# Patient Record
Sex: FEMALE | Race: WHITE | Employment: OTHER | URBAN - METROPOLITAN AREA
[De-identification: names, ages, dates, MRNs, and addresses within clinical notes are randomized per-mention and may not be internally consistent; named-entity substitution may affect disease eponyms.]

---

## 2022-09-17 ENCOUNTER — APPOINTMENT (OUTPATIENT)
Dept: GENERAL RADIOLOGY | Age: 67
End: 2022-09-17
Payer: MEDICARE

## 2022-09-17 ENCOUNTER — HOSPITAL ENCOUNTER (EMERGENCY)
Age: 67
Discharge: HOME | End: 2022-09-17
Attending: EMERGENCY MEDICINE
Payer: MEDICARE

## 2022-09-17 VITALS
SYSTOLIC BLOOD PRESSURE: 157 MMHG | DIASTOLIC BLOOD PRESSURE: 79 MMHG | HEART RATE: 78 BPM | TEMPERATURE: 99 F | HEIGHT: 68 IN | WEIGHT: 151 LBS | BODY MASS INDEX: 22.88 KG/M2 | OXYGEN SATURATION: 100 % | RESPIRATION RATE: 21 BRPM

## 2022-09-17 DIAGNOSIS — S73.005A DISLOCATION OF LEFT HIP, INITIAL ENCOUNTER (HCC): Primary | ICD-10-CM

## 2022-09-17 PROCEDURE — 99285 EMERGENCY DEPT VISIT HI MDM: CPT

## 2022-09-17 PROCEDURE — 73502 X-RAY EXAM HIP UNI 2-3 VIEWS: CPT

## 2022-09-17 PROCEDURE — 27250 TREAT HIP DISLOCATION: CPT

## 2022-09-17 PROCEDURE — 96374 THER/PROPH/DIAG INJ IV PUSH: CPT

## 2022-09-17 PROCEDURE — 6360000002 HC RX W HCPCS: Performed by: EMERGENCY MEDICINE

## 2022-09-17 RX ORDER — MORPHINE SULFATE 4 MG/ML
4 INJECTION, SOLUTION INTRAMUSCULAR; INTRAVENOUS ONCE
Status: COMPLETED | OUTPATIENT
Start: 2022-09-17 | End: 2022-09-17

## 2022-09-17 RX ORDER — PROPOFOL 10 MG/ML
60 INJECTION, EMULSION INTRAVENOUS ONCE
Status: COMPLETED | OUTPATIENT
Start: 2022-09-17 | End: 2022-09-17

## 2022-09-17 RX ORDER — HYDROCODONE BITARTRATE AND ACETAMINOPHEN 5; 325 MG/1; MG/1
1 TABLET ORAL EVERY 4 HOURS PRN
Qty: 12 TABLET | Refills: 0 | Status: SHIPPED | OUTPATIENT
Start: 2022-09-17 | End: 2022-09-20

## 2022-09-17 RX ORDER — ACETAMINOPHEN 325 MG/1
650 TABLET ORAL ONCE
Status: DISCONTINUED | OUTPATIENT
Start: 2022-09-17 | End: 2022-09-17

## 2022-09-17 RX ADMIN — PROPOFOL 60 MG: 10 INJECTION, EMULSION INTRAVENOUS at 19:55

## 2022-09-17 RX ADMIN — MORPHINE SULFATE 4 MG: 4 INJECTION, SOLUTION INTRAMUSCULAR; INTRAVENOUS at 17:29

## 2022-09-17 ASSESSMENT — PAIN DESCRIPTION - LOCATION: LOCATION: HIP

## 2022-09-17 ASSESSMENT — PAIN - FUNCTIONAL ASSESSMENT: PAIN_FUNCTIONAL_ASSESSMENT: 0-10

## 2022-09-17 ASSESSMENT — PAIN DESCRIPTION - ORIENTATION: ORIENTATION: LEFT

## 2022-09-17 ASSESSMENT — PAIN SCALES - GENERAL: PAINLEVEL_OUTOF10: 9

## 2022-09-17 ASSESSMENT — PAIN DESCRIPTION - PAIN TYPE: TYPE: ACUTE PAIN

## 2022-09-17 ASSESSMENT — ENCOUNTER SYMPTOMS
ABDOMINAL PAIN: 0
BACK PAIN: 0
SHORTNESS OF BREATH: 0

## 2022-09-17 ASSESSMENT — PAIN DESCRIPTION - DESCRIPTORS: DESCRIPTORS: SHOOTING

## 2022-09-17 NOTE — ED PROVIDER NOTES
Patient is a 73 yo female who has a PMHx of GERD, bilateral hip replacement, is here for hip pain. [place] [facility] [place], here this weekend for a rally and felt her left hip pop out with sudden, severe pain when she got out of the car. She states that she had the same feeling 4 days ago when she went to the [facility]. [place] [facility], they popped her hip back with fentanyl. Today, she haven't taken anything for pain. She has her leg internally rotated and unable to move her left extremity but she was able to wiggle her toes and sensation is intact. Distal pulses felt. She had a facelift a week ago so visible bruises around her jaws bilaterally. The history is provided by the patient. Patient is a 74 y.o. female presents with a chief complaint of left hip pain. This has been occurring for a few hours. Patient states that it gets better with rest.  Patient states that it gets worse with movement. Patient states that it is severe in severity. Patient states it was acute in onset. Review of Systems   Eyes:  Negative for visual disturbance. Respiratory:  Negative for shortness of breath. Cardiovascular:  Negative for chest pain and leg swelling. Gastrointestinal:  Negative for abdominal pain. Genitourinary:  Negative for flank pain. Musculoskeletal:  Negative for back pain. Pain in the left hip, doesn't radiate anywhere, unable to move it   Neurological:  Negative for dizziness and headaches. Physical Exam  Constitutional:       General: She is in acute distress. Appearance: Normal appearance. She is normal weight. HENT:      Head: Normocephalic and atraumatic. Comments: Bruises present on her cheeks bilaterally, post face lift a week ago  Eyes:      Extraocular Movements: Extraocular movements intact. Pupils: Pupils are equal, round, and reactive to light. Cardiovascular:      Rate and Rhythm: Normal rate and regular rhythm.       Pulses: Normal pulses. Heart sounds: Normal heart sounds. Pulmonary:      Effort: Pulmonary effort is normal.      Breath sounds: Normal breath sounds. Musculoskeletal:         General: Tenderness present. Cervical back: Normal range of motion and neck supple. Comments: Left leg is internally rotated at the hip, able to move her toes voluntarily with no pain. Sensation intact, distal pulses felt. Skin:     General: Skin is warm and dry. Neurological:      General: No focal deficit present. Mental Status: She is alert and oriented to person, place, and time. Sensory: No sensory deficit. Motor: No weakness. Psychiatric:         Mood and Affect: Mood normal.         Behavior: Behavior normal.          MDM  Number of Diagnoses or Management Options  Dislocation of left hip, initial encounter [facility]): established and improving  Diagnosis management comments: Patient is a 75 yo female who came in with pain in the left hip. She is unable to mobilize the hip. She is from [place] and she was here for a rally. Patient was getting out of the car when she felt a pop in her hip. She had a dislocation 4 days ago and had her hip reduced in [place]. Patient states that this is the same as that time. XR confirms. Patient got her hip reduced in the ED under conscious sedation. She was observed after the procedure. She was discharged with Norco for analgesia and asked to follow up with her orthopedic surgeon.        Amount and/or Complexity of Data Reviewed  Clinical lab tests: reviewed and ordered  Tests in the radiology section of CPT®: reviewed and ordered  Tests in the medicine section of CPT®: reviewed    Risk of Complications, Morbidity, and/or Mortality  Presenting problems: low  Diagnostic procedures: low  Management options: low    Patient Progress  Patient progress: improved       ED Course as of 09/17/22 2126   Sat Sep 17, 2022   1720 Analgesia given, XR ordered [PK]   1954 XR shows posterior todays results, in addition to providing specific details for the plan of care and counseling regarding the diagnosis and prognosis. Their questions are answered at this time and they are agreeable with the plan. I discussed at length with them reasons for immediate return here for re evaluation. They will followup with their orthopedic surgeon by calling their office as soon as possible.      --------------------------------- ADDITIONAL PROVIDER NOTES ---------------------------------  At this time the patient is without objective evidence of an acute process requiring hospitalization or inpatient management. They have remained hemodynamically stable throughout their entire ED visit and are stable for discharge with outpatient follow-up. The plan has been discussed in detail and they are aware of the specific conditions for emergent return, as well as the importance of follow-up. Discharge Medication List as of 9/17/2022  8:06 PM        START taking these medications    Details   HYDROcodone-acetaminophen (NORCO) 5-325 MG per tablet Take 1 tablet by mouth every 4 hours as needed for Pain for up to 3 days. Intended supply: 3 days. Take lowest dose possible to manage pain, Disp-12 tablet, R-0Print             Diagnosis:  1. Dislocation of left hip, initial encounter [facility])        Disposition:  Patient's disposition: Discharge to home  Patient's condition is stable. Patient was given return precautions. Labs were interpreted by me. Patient will follow up with their primary care provider. Patient is agreeable to this plan. Patient has remained stable throughout their stay in the ED. Patient was seen and evaluated by myself and my attending Maged Driver DO. Assessment and Plan discussed with attending provider, please see attestation for final plan of care. This note was done using dictation software and there may be some grammatical errors associated with this.     Padma Ann MD Shirley Loja MD  Resident  09/17/22 4527

## 2022-09-18 NOTE — ED PROVIDER NOTES
-------------------------------- Conscious Sedation Procedure Note -----------------------------  Patient Name: Brendon Candelaria   Medical Record Number: [id number]  Date: 9/17/22   Time: 8:07 PM EDT   Room/Bed: 17A/17A-17    Indication: hip dislocation  Consent: I have discussed with the patient and/or the patient representative the indication, alternatives, and the possible risks and/or complications of the planned procedure and the anesthesia methods. The patient and/or patient representative appear to understand and agree to proceed. Physician Involvement: The attending physician was present and supervising this procedure. 9/17/22     Time: 1945 (pre-procedure start time)  Pre-Sedation Documentation and Exam: I have personally completed a history, physical exam & review of systems for this patient (see notes). Vital signs have been reviewed (see flow sheet for vitals). I have reviewed the patient's history and review of systems. Airway Assessment: normal  Prior History of Anesthesia Complications: none  ASA Classification: Class 2 - A normal healthy patient with mild systemic disease  Sedation/ Anesthesia Plan: intravenous sedation  Medications Used: propofol intravenously  Monitoring and Safety: The patient was placed on a cardiac monitor and vital signs, pulse oximetry and level of consciousness were continuously evaluated throughout the procedure. The patient was closely monitored until recovery from the medications was complete and the patient had returned to baseline status.  Respiratory therapy was on standby at all times during the procedure.    ----------------------------------- Post Conscious Sedation Note -----------------------------------    9/17/22     Time: 2010 (post-procedure stop time)  Post-Sedation Vital Signs: Vital signs were reviewed and were stable after the procedure (see flow sheet for vitals)       Post-Sedation Exam: Lungs: clear to auscultation bilaterally       Complications: none    Electronically Signed by: Olivia Hong DO        PROCEDURE NOTE    9/17/22       Time: 2000    JOINT  REDUCTION  PROCEDURE  Risks, benefits and alternatives (for applicable procedures below) described. Performed By: Olivia Hong DO. Indication: Joint dislocation  Informed consent: Written consent obtained. The patient was counseled regarding the procedure in person, it's indications, risks, potential complications and alternatives and any questions were answered. Consent was obtained. .  Location:   left  hip  Procedure:  Anesthsetic/anesthsia was obtained using conscious sedation with propofol intravenously. Attempted reduction was performed by BOWEN HIP METHOD: Traction, internal rotation, and then external rotation once the femoral hip clears the acetabular rim. Patient supine with uninjured hip and knee flexed. one of the surgeon's hands is place underneath the injured knee and over top of the uninjured knee; the other hand grab's the ankle (injured side) to generate traction (by pressing down on ankle). r and was successful. Patient tolerated the procedure well. Post-reduction XR's:  were obtained and revealed satisfactory reduction.   Orthopaedic Consultation:  No.            Olivia Hong DO  09/17/22 2009

## 2025-06-24 ENCOUNTER — APPOINTMENT (OUTPATIENT)
Dept: RADIOLOGY | Facility: HOSPITAL | Age: 70
DRG: 660 | End: 2025-06-24
Payer: MEDICARE

## 2025-06-24 ENCOUNTER — HOSPITAL ENCOUNTER (EMERGENCY)
Dept: CARDIOLOGY | Facility: HOSPITAL | Age: 70
Discharge: HOME | DRG: 660 | End: 2025-06-24
Payer: MEDICARE

## 2025-06-24 ENCOUNTER — HOSPITAL ENCOUNTER (INPATIENT)
Facility: HOSPITAL | Age: 70
LOS: 2 days | Discharge: HOME | DRG: 660 | End: 2025-06-26
Attending: EMERGENCY MEDICINE | Admitting: INTERNAL MEDICINE
Payer: MEDICARE

## 2025-06-24 DIAGNOSIS — N13.9 OBSTRUCTIVE UROPATHY: Primary | ICD-10-CM

## 2025-06-24 DIAGNOSIS — G89.18 POST-OP PAIN: ICD-10-CM

## 2025-06-24 DIAGNOSIS — N18.9 ACUTE KIDNEY INJURY SUPERIMPOSED ON CHRONIC KIDNEY DISEASE: ICD-10-CM

## 2025-06-24 DIAGNOSIS — N17.9 ACUTE KIDNEY INJURY SUPERIMPOSED ON CHRONIC KIDNEY DISEASE: ICD-10-CM

## 2025-06-24 DIAGNOSIS — N20.1 CALCULUS OF URETER: ICD-10-CM

## 2025-06-24 DIAGNOSIS — N20.0 NEPHROLITHIASIS: ICD-10-CM

## 2025-06-24 DIAGNOSIS — N13.2 HYDRONEPHROSIS WITH URINARY OBSTRUCTION DUE TO RENAL CALCULUS: ICD-10-CM

## 2025-06-24 PROBLEM — N13.30 HYDRONEPHROSIS OF LEFT KIDNEY: Status: ACTIVE | Noted: 2025-06-24

## 2025-06-24 PROBLEM — E03.9 HYPOTHYROIDISM: Status: ACTIVE | Noted: 2025-06-24

## 2025-06-24 PROBLEM — E78.5 HYPERLIPIDEMIA: Status: ACTIVE | Noted: 2025-06-24

## 2025-06-24 PROBLEM — I10 PRIMARY HYPERTENSION: Status: ACTIVE | Noted: 2025-06-24

## 2025-06-24 PROBLEM — R73.03 PREDIABETES: Status: ACTIVE | Noted: 2025-06-24

## 2025-06-24 LAB
ALBUMIN SERPL BCP-MCNC: 4.7 G/DL (ref 3.4–5)
ALP SERPL-CCNC: 64 U/L (ref 33–136)
ALT SERPL W P-5'-P-CCNC: 17 U/L (ref 7–45)
ANION GAP SERPL CALC-SCNC: 15 MMOL/L (ref 10–20)
APPEARANCE UR: CLEAR
AST SERPL W P-5'-P-CCNC: 30 U/L (ref 9–39)
ATRIAL RATE: 76 BPM
BACTERIA #/AREA URNS AUTO: ABNORMAL /HPF
BASOPHILS # BLD AUTO: 0.02 X10*3/UL (ref 0–0.1)
BASOPHILS NFR BLD AUTO: 0.4 %
BILIRUB SERPL-MCNC: 0.8 MG/DL (ref 0–1.2)
BILIRUB UR STRIP.AUTO-MCNC: NEGATIVE MG/DL
BUN SERPL-MCNC: 21 MG/DL (ref 6–23)
CALCIUM SERPL-MCNC: 9.4 MG/DL (ref 8.6–10.3)
CARDIAC TROPONIN I PNL SERPL HS: 3 NG/L (ref 0–13)
CHLORIDE SERPL-SCNC: 105 MMOL/L (ref 98–107)
CHLORIDE UR-SCNC: 127 MMOL/L
CHLORIDE/CREATININE (MMOL/G) IN URINE: 182 MMOL/G CREAT (ref 38–318)
CO2 SERPL-SCNC: 22 MMOL/L (ref 21–32)
COLOR UR: ABNORMAL
CREAT SERPL-MCNC: 1.33 MG/DL (ref 0.5–1.05)
CREAT UR-MCNC: 69.8 MG/DL (ref 20–320)
CREAT UR-MCNC: 69.8 MG/DL (ref 20–320)
EGFRCR SERPLBLD CKD-EPI 2021: 43 ML/MIN/1.73M*2
EOSINOPHIL # BLD AUTO: 0.08 X10*3/UL (ref 0–0.4)
EOSINOPHIL NFR BLD AUTO: 1.5 %
ERYTHROCYTE [DISTWIDTH] IN BLOOD BY AUTOMATED COUNT: 13.3 % (ref 11.5–14.5)
GLUCOSE SERPL-MCNC: 110 MG/DL (ref 74–99)
GLUCOSE UR STRIP.AUTO-MCNC: NORMAL MG/DL
HCT VFR BLD AUTO: 38.6 % (ref 36–46)
HGB BLD-MCNC: 12.9 G/DL (ref 12–16)
IMM GRANULOCYTES # BLD AUTO: 0.02 X10*3/UL (ref 0–0.5)
IMM GRANULOCYTES NFR BLD AUTO: 0.4 % (ref 0–0.9)
KETONES UR STRIP.AUTO-MCNC: NEGATIVE MG/DL
LACTATE SERPL-SCNC: 1.2 MMOL/L (ref 0.4–2)
LEUKOCYTE ESTERASE UR QL STRIP.AUTO: NEGATIVE
LIPASE SERPL-CCNC: 22 U/L (ref 9–82)
LYMPHOCYTES # BLD AUTO: 1.57 X10*3/UL (ref 0.8–3)
LYMPHOCYTES NFR BLD AUTO: 29.4 %
MCH RBC QN AUTO: 30.6 PG (ref 26–34)
MCHC RBC AUTO-ENTMCNC: 33.4 G/DL (ref 32–36)
MCV RBC AUTO: 92 FL (ref 80–100)
MONOCYTES # BLD AUTO: 0.42 X10*3/UL (ref 0.05–0.8)
MONOCYTES NFR BLD AUTO: 7.9 %
MUCOUS THREADS #/AREA URNS AUTO: ABNORMAL /LPF
NEUTROPHILS # BLD AUTO: 3.23 X10*3/UL (ref 1.6–5.5)
NEUTROPHILS NFR BLD AUTO: 60.4 %
NITRITE UR QL STRIP.AUTO: NEGATIVE
NRBC BLD-RTO: 0 /100 WBCS (ref 0–0)
P AXIS: -21 DEGREES
P OFFSET: 177 MS
P ONSET: 149 MS
PH UR STRIP.AUTO: 5.5 [PH]
PLATELET # BLD AUTO: 206 X10*3/UL (ref 150–450)
POTASSIUM SERPL-SCNC: 4 MMOL/L (ref 3.5–5.3)
POTASSIUM UR-SCNC: 30 MMOL/L
POTASSIUM/CREAT UR-RTO: 43 MMOL/G CREAT
PR INTERVAL: 144 MS
PROT SERPL-MCNC: 7 G/DL (ref 6.4–8.2)
PROT UR STRIP.AUTO-MCNC: NEGATIVE MG/DL
PROT UR-ACNC: 15 MG/DL (ref 5–24)
PROT/CREAT UR: 0.21 MG/MG CREAT (ref 0–0.17)
Q ONSET: 221 MS
QRS COUNT: 12 BEATS
QRS DURATION: 78 MS
QT INTERVAL: 412 MS
QTC CALCULATION(BAZETT): 463 MS
QTC FREDERICIA: 445 MS
R AXIS: 74 DEGREES
RBC # BLD AUTO: 4.22 X10*6/UL (ref 4–5.2)
RBC # UR STRIP.AUTO: ABNORMAL MG/DL
RBC #/AREA URNS AUTO: >20 /HPF
SODIUM SERPL-SCNC: 138 MMOL/L (ref 136–145)
SODIUM UR-SCNC: 116 MMOL/L
SODIUM/CREAT UR-RTO: 166 MMOL/G CREAT
SP GR UR STRIP.AUTO: 1.03
T AXIS: 70 DEGREES
T OFFSET: 427 MS
UROBILINOGEN UR STRIP.AUTO-MCNC: NORMAL MG/DL
VENTRICULAR RATE: 76 BPM
WBC # BLD AUTO: 5.3 X10*3/UL (ref 4.4–11.3)
WBC #/AREA URNS AUTO: ABNORMAL /HPF

## 2025-06-24 PROCEDURE — 96374 THER/PROPH/DIAG INJ IV PUSH: CPT

## 2025-06-24 PROCEDURE — 82570 ASSAY OF URINE CREATININE: CPT | Performed by: INTERNAL MEDICINE

## 2025-06-24 PROCEDURE — 87086 URINE CULTURE/COLONY COUNT: CPT | Mod: ELYLAB | Performed by: PHYSICIAN ASSISTANT

## 2025-06-24 PROCEDURE — G0378 HOSPITAL OBSERVATION PER HR: HCPCS

## 2025-06-24 PROCEDURE — 74177 CT ABD & PELVIS W/CONTRAST: CPT | Performed by: RADIOLOGY

## 2025-06-24 PROCEDURE — 84484 ASSAY OF TROPONIN QUANT: CPT | Performed by: PHYSICIAN ASSISTANT

## 2025-06-24 PROCEDURE — 36415 COLL VENOUS BLD VENIPUNCTURE: CPT | Performed by: PHYSICIAN ASSISTANT

## 2025-06-24 PROCEDURE — 2500000004 HC RX 250 GENERAL PHARMACY W/ HCPCS (ALT 636 FOR OP/ED): Performed by: INTERNAL MEDICINE

## 2025-06-24 PROCEDURE — 0T778DZ DILATION OF LEFT URETER WITH INTRALUMINAL DEVICE, VIA NATURAL OR ARTIFICIAL OPENING ENDOSCOPIC: ICD-10-PCS | Performed by: UROLOGY

## 2025-06-24 PROCEDURE — 81001 URINALYSIS AUTO W/SCOPE: CPT | Performed by: PHYSICIAN ASSISTANT

## 2025-06-24 PROCEDURE — 99285 EMERGENCY DEPT VISIT HI MDM: CPT | Mod: 25 | Performed by: EMERGENCY MEDICINE

## 2025-06-24 PROCEDURE — 96376 TX/PRO/DX INJ SAME DRUG ADON: CPT

## 2025-06-24 PROCEDURE — 99223 1ST HOSP IP/OBS HIGH 75: CPT | Performed by: INTERNAL MEDICINE

## 2025-06-24 PROCEDURE — 85025 COMPLETE CBC W/AUTO DIFF WBC: CPT | Performed by: PHYSICIAN ASSISTANT

## 2025-06-24 PROCEDURE — 83690 ASSAY OF LIPASE: CPT | Performed by: PHYSICIAN ASSISTANT

## 2025-06-24 PROCEDURE — 2500000004 HC RX 250 GENERAL PHARMACY W/ HCPCS (ALT 636 FOR OP/ED): Mod: JZ | Performed by: PHYSICIAN ASSISTANT

## 2025-06-24 PROCEDURE — 96361 HYDRATE IV INFUSION ADD-ON: CPT

## 2025-06-24 PROCEDURE — 74177 CT ABD & PELVIS W/CONTRAST: CPT

## 2025-06-24 PROCEDURE — 82436 ASSAY OF URINE CHLORIDE: CPT | Performed by: INTERNAL MEDICINE

## 2025-06-24 PROCEDURE — 93005 ELECTROCARDIOGRAM TRACING: CPT

## 2025-06-24 PROCEDURE — 2550000001 HC RX 255 CONTRASTS: Performed by: PHYSICIAN ASSISTANT

## 2025-06-24 PROCEDURE — 80053 COMPREHEN METABOLIC PANEL: CPT | Performed by: PHYSICIAN ASSISTANT

## 2025-06-24 PROCEDURE — 83605 ASSAY OF LACTIC ACID: CPT | Performed by: PHYSICIAN ASSISTANT

## 2025-06-24 PROCEDURE — 1210000001 HC SEMI-PRIVATE ROOM DAILY

## 2025-06-24 PROCEDURE — 96375 TX/PRO/DX INJ NEW DRUG ADDON: CPT

## 2025-06-24 RX ORDER — HYDROXYZINE HYDROCHLORIDE 10 MG/1
10 TABLET, FILM COATED ORAL 2 TIMES DAILY PRN
COMMUNITY

## 2025-06-24 RX ORDER — SUCRALFATE 1 G/1
1 TABLET ORAL 3 TIMES DAILY
COMMUNITY

## 2025-06-24 RX ORDER — HYDROCORTISONE 25 MG/ML
LOTION TOPICAL 2 TIMES DAILY
Status: ON HOLD | COMMUNITY
End: 2025-06-24 | Stop reason: ENTERED-IN-ERROR

## 2025-06-24 RX ORDER — DOXEPIN HYDROCHLORIDE 10 MG/1
20 CAPSULE ORAL NIGHTLY
COMMUNITY

## 2025-06-24 RX ORDER — CHOLECALCIFEROL (VITAMIN D3) 25 MCG
50 TABLET ORAL DAILY
COMMUNITY

## 2025-06-24 RX ORDER — SODIUM CHLORIDE 9 MG/ML
125 INJECTION, SOLUTION INTRAVENOUS CONTINUOUS
Status: ACTIVE | OUTPATIENT
Start: 2025-06-24 | End: 2025-06-25

## 2025-06-24 RX ORDER — DOXEPIN HYDROCHLORIDE 10 MG/1
20 CAPSULE ORAL NIGHTLY
Status: DISCONTINUED | OUTPATIENT
Start: 2025-06-25 | End: 2025-06-26 | Stop reason: HOSPADM

## 2025-06-24 RX ORDER — LIOTHYRONINE SODIUM 5 UG/1
10 TABLET ORAL DAILY
Status: DISCONTINUED | OUTPATIENT
Start: 2025-06-25 | End: 2025-06-26 | Stop reason: HOSPADM

## 2025-06-24 RX ORDER — PANTOPRAZOLE SODIUM 40 MG/1
40 TABLET, DELAYED RELEASE ORAL 2 TIMES DAILY
COMMUNITY

## 2025-06-24 RX ORDER — HYOSCYAMINE SULFATE 0.12 MG/1
0.12 TABLET, ORALLY DISINTEGRATING ORAL EVERY 6 HOURS PRN
COMMUNITY

## 2025-06-24 RX ORDER — ONDANSETRON HYDROCHLORIDE 2 MG/ML
4 INJECTION, SOLUTION INTRAVENOUS ONCE
Status: COMPLETED | OUTPATIENT
Start: 2025-06-24 | End: 2025-06-24

## 2025-06-24 RX ORDER — DICYCLOMINE HYDROCHLORIDE 20 MG/1
20 TABLET ORAL 3 TIMES DAILY
COMMUNITY

## 2025-06-24 RX ORDER — SUCRALFATE 1 G/1
1 TABLET ORAL 3 TIMES DAILY
Status: DISCONTINUED | OUTPATIENT
Start: 2025-06-25 | End: 2025-06-26 | Stop reason: HOSPADM

## 2025-06-24 RX ORDER — METRONIDAZOLE 250 MG/1
250 TABLET ORAL 3 TIMES DAILY
COMMUNITY

## 2025-06-24 RX ORDER — SERTRALINE HYDROCHLORIDE 50 MG/1
100 TABLET, FILM COATED ORAL DAILY
Status: DISCONTINUED | OUTPATIENT
Start: 2025-06-25 | End: 2025-06-26 | Stop reason: HOSPADM

## 2025-06-24 RX ORDER — METRONIDAZOLE 250 MG/1
250 TABLET ORAL 3 TIMES DAILY
Status: DISCONTINUED | OUTPATIENT
Start: 2025-06-25 | End: 2025-06-26 | Stop reason: HOSPADM

## 2025-06-24 RX ORDER — ACETAMINOPHEN 160 MG/5ML
650 SOLUTION ORAL EVERY 4 HOURS PRN
Status: DISCONTINUED | OUTPATIENT
Start: 2025-06-24 | End: 2025-06-26 | Stop reason: HOSPADM

## 2025-06-24 RX ORDER — HYDROCORTISONE 25 MG/G
1 OINTMENT TOPICAL 2 TIMES DAILY PRN
COMMUNITY

## 2025-06-24 RX ORDER — SERTRALINE HYDROCHLORIDE 100 MG/1
100 TABLET, FILM COATED ORAL DAILY
COMMUNITY

## 2025-06-24 RX ORDER — LEVOTHYROXINE SODIUM 112 UG/1
112 TABLET ORAL DAILY
COMMUNITY

## 2025-06-24 RX ORDER — ACETAMINOPHEN 325 MG/1
650 TABLET ORAL EVERY 4 HOURS PRN
Status: DISCONTINUED | OUTPATIENT
Start: 2025-06-24 | End: 2025-06-26 | Stop reason: HOSPADM

## 2025-06-24 RX ORDER — LEVOTHYROXINE SODIUM 112 UG/1
112 TABLET ORAL DAILY
Status: DISCONTINUED | OUTPATIENT
Start: 2025-06-25 | End: 2025-06-26 | Stop reason: HOSPADM

## 2025-06-24 RX ORDER — TALC
3 POWDER (GRAM) TOPICAL NIGHTLY PRN
Status: DISCONTINUED | OUTPATIENT
Start: 2025-06-24 | End: 2025-06-26 | Stop reason: HOSPADM

## 2025-06-24 RX ORDER — ATORVASTATIN CALCIUM 80 MG/1
80 TABLET, FILM COATED ORAL NIGHTLY
Status: DISCONTINUED | OUTPATIENT
Start: 2025-06-25 | End: 2025-06-26 | Stop reason: HOSPADM

## 2025-06-24 RX ORDER — POLYETHYLENE GLYCOL 3350 17 G/17G
17 POWDER, FOR SOLUTION ORAL DAILY PRN
Status: DISCONTINUED | OUTPATIENT
Start: 2025-06-24 | End: 2025-06-26 | Stop reason: HOSPADM

## 2025-06-24 RX ORDER — ONDANSETRON 4 MG/1
4 TABLET, ORALLY DISINTEGRATING ORAL EVERY 8 HOURS PRN
Status: DISCONTINUED | OUTPATIENT
Start: 2025-06-24 | End: 2025-06-26 | Stop reason: HOSPADM

## 2025-06-24 RX ORDER — ONDANSETRON HYDROCHLORIDE 2 MG/ML
4 INJECTION, SOLUTION INTRAVENOUS EVERY 8 HOURS PRN
Status: DISCONTINUED | OUTPATIENT
Start: 2025-06-24 | End: 2025-06-26 | Stop reason: HOSPADM

## 2025-06-24 RX ORDER — DICYCLOMINE HYDROCHLORIDE 10 MG/1
20 CAPSULE ORAL 3 TIMES DAILY
Status: DISCONTINUED | OUTPATIENT
Start: 2025-06-25 | End: 2025-06-26 | Stop reason: HOSPADM

## 2025-06-24 RX ORDER — HYDROXYZINE HYDROCHLORIDE 10 MG/1
10 TABLET, FILM COATED ORAL 2 TIMES DAILY PRN
Status: DISCONTINUED | OUTPATIENT
Start: 2025-06-24 | End: 2025-06-26 | Stop reason: HOSPADM

## 2025-06-24 RX ORDER — ATORVASTATIN CALCIUM 80 MG/1
80 TABLET, FILM COATED ORAL NIGHTLY
COMMUNITY

## 2025-06-24 RX ORDER — TAMSULOSIN HYDROCHLORIDE 0.4 MG/1
0.4 CAPSULE ORAL DAILY
Status: DISCONTINUED | OUTPATIENT
Start: 2025-06-24 | End: 2025-06-26 | Stop reason: HOSPADM

## 2025-06-24 RX ORDER — LIOTHYRONINE SODIUM 5 UG/1
10 TABLET ORAL DAILY
COMMUNITY

## 2025-06-24 RX ORDER — ACETAMINOPHEN 650 MG/1
650 SUPPOSITORY RECTAL EVERY 4 HOURS PRN
Status: DISCONTINUED | OUTPATIENT
Start: 2025-06-24 | End: 2025-06-26 | Stop reason: HOSPADM

## 2025-06-24 RX ORDER — PANTOPRAZOLE SODIUM 40 MG/1
40 TABLET, DELAYED RELEASE ORAL 2 TIMES DAILY
Status: DISCONTINUED | OUTPATIENT
Start: 2025-06-25 | End: 2025-06-26 | Stop reason: HOSPADM

## 2025-06-24 RX ADMIN — ONDANSETRON 4 MG: 2 INJECTION INTRAMUSCULAR; INTRAVENOUS at 17:15

## 2025-06-24 RX ADMIN — IOHEXOL 75 ML: 350 INJECTION, SOLUTION INTRAVENOUS at 17:52

## 2025-06-24 RX ADMIN — DICYCLOMINE HYDROCHLORIDE 20 MG: 10 CAPSULE ORAL at 23:58

## 2025-06-24 RX ADMIN — HYDROMORPHONE HYDROCHLORIDE 0.5 MG: 1 INJECTION, SOLUTION INTRAMUSCULAR; INTRAVENOUS; SUBCUTANEOUS at 17:16

## 2025-06-24 RX ADMIN — SODIUM CHLORIDE 500 ML: 0.9 INJECTION, SOLUTION INTRAVENOUS at 17:16

## 2025-06-24 RX ADMIN — SODIUM CHLORIDE 125 ML/HR: 0.9 INJECTION, SOLUTION INTRAVENOUS at 22:24

## 2025-06-24 RX ADMIN — METRONIDAZOLE 250 MG: 250 TABLET ORAL at 23:59

## 2025-06-24 RX ADMIN — HYDROMORPHONE HYDROCHLORIDE 0.5 MG: 1 INJECTION, SOLUTION INTRAMUSCULAR; INTRAVENOUS; SUBCUTANEOUS at 18:47

## 2025-06-24 RX ADMIN — HYDROMORPHONE HYDROCHLORIDE 0.5 MG: 1 INJECTION, SOLUTION INTRAMUSCULAR; INTRAVENOUS; SUBCUTANEOUS at 22:22

## 2025-06-24 SDOH — ECONOMIC STABILITY: HOUSING INSECURITY: AT ANY TIME IN THE PAST 12 MONTHS, WERE YOU HOMELESS OR LIVING IN A SHELTER (INCLUDING NOW)?: NO

## 2025-06-24 SDOH — SOCIAL STABILITY: SOCIAL INSECURITY: WITHIN THE LAST YEAR, HAVE YOU BEEN HUMILIATED OR EMOTIONALLY ABUSED IN OTHER WAYS BY YOUR PARTNER OR EX-PARTNER?: NO

## 2025-06-24 SDOH — SOCIAL STABILITY: SOCIAL INSECURITY
WITHIN THE LAST YEAR, HAVE YOU BEEN RAPED OR FORCED TO HAVE ANY KIND OF SEXUAL ACTIVITY BY YOUR PARTNER OR EX-PARTNER?: NO

## 2025-06-24 SDOH — SOCIAL STABILITY: SOCIAL INSECURITY: WITHIN THE LAST YEAR, HAVE YOU BEEN AFRAID OF YOUR PARTNER OR EX-PARTNER?: NO

## 2025-06-24 SDOH — SOCIAL STABILITY: SOCIAL INSECURITY: DO YOU FEEL ANYONE HAS EXPLOITED OR TAKEN ADVANTAGE OF YOU FINANCIALLY OR OF YOUR PERSONAL PROPERTY?: NO

## 2025-06-24 SDOH — ECONOMIC STABILITY: FOOD INSECURITY: HOW HARD IS IT FOR YOU TO PAY FOR THE VERY BASICS LIKE FOOD, HOUSING, MEDICAL CARE, AND HEATING?: NOT HARD AT ALL

## 2025-06-24 SDOH — ECONOMIC STABILITY: FOOD INSECURITY: WITHIN THE PAST 12 MONTHS, YOU WORRIED THAT YOUR FOOD WOULD RUN OUT BEFORE YOU GOT THE MONEY TO BUY MORE.: NEVER TRUE

## 2025-06-24 SDOH — SOCIAL STABILITY: SOCIAL INSECURITY: DOES ANYONE TRY TO KEEP YOU FROM HAVING/CONTACTING OTHER FRIENDS OR DOING THINGS OUTSIDE YOUR HOME?: NO

## 2025-06-24 SDOH — ECONOMIC STABILITY: HOUSING INSECURITY: IN THE LAST 12 MONTHS, WAS THERE A TIME WHEN YOU WERE NOT ABLE TO PAY THE MORTGAGE OR RENT ON TIME?: NO

## 2025-06-24 SDOH — ECONOMIC STABILITY: INCOME INSECURITY: IN THE PAST 12 MONTHS HAS THE ELECTRIC, GAS, OIL, OR WATER COMPANY THREATENED TO SHUT OFF SERVICES IN YOUR HOME?: NO

## 2025-06-24 SDOH — HEALTH STABILITY: MENTAL HEALTH: HOW MANY DRINKS CONTAINING ALCOHOL DO YOU HAVE ON A TYPICAL DAY WHEN YOU ARE DRINKING?: 1 OR 2

## 2025-06-24 SDOH — SOCIAL STABILITY: SOCIAL INSECURITY
WITHIN THE LAST YEAR, HAVE YOU BEEN KICKED, HIT, SLAPPED, OR OTHERWISE PHYSICALLY HURT BY YOUR PARTNER OR EX-PARTNER?: NO

## 2025-06-24 SDOH — HEALTH STABILITY: MENTAL HEALTH: HOW OFTEN DO YOU HAVE SIX OR MORE DRINKS ON ONE OCCASION?: NEVER

## 2025-06-24 SDOH — SOCIAL STABILITY: SOCIAL INSECURITY: DO YOU FEEL UNSAFE GOING BACK TO THE PLACE WHERE YOU ARE LIVING?: NO

## 2025-06-24 SDOH — ECONOMIC STABILITY: FOOD INSECURITY: WITHIN THE PAST 12 MONTHS, THE FOOD YOU BOUGHT JUST DIDN'T LAST AND YOU DIDN'T HAVE MONEY TO GET MORE.: NEVER TRUE

## 2025-06-24 SDOH — HEALTH STABILITY: MENTAL HEALTH: HOW OFTEN DO YOU HAVE A DRINK CONTAINING ALCOHOL?: 2-4 TIMES A MONTH

## 2025-06-24 SDOH — SOCIAL STABILITY: SOCIAL INSECURITY: WERE YOU ABLE TO COMPLETE ALL THE BEHAVIORAL HEALTH SCREENINGS?: YES

## 2025-06-24 SDOH — ECONOMIC STABILITY: TRANSPORTATION INSECURITY: IN THE PAST 12 MONTHS, HAS LACK OF TRANSPORTATION KEPT YOU FROM MEDICAL APPOINTMENTS OR FROM GETTING MEDICATIONS?: NO

## 2025-06-24 SDOH — SOCIAL STABILITY: SOCIAL INSECURITY: ARE THERE ANY APPARENT SIGNS OF INJURIES/BEHAVIORS THAT COULD BE RELATED TO ABUSE/NEGLECT?: NO

## 2025-06-24 SDOH — SOCIAL STABILITY: SOCIAL INSECURITY: HAS ANYONE EVER THREATENED TO HURT YOUR FAMILY OR YOUR PETS?: NO

## 2025-06-24 SDOH — HEALTH STABILITY: PHYSICAL HEALTH: ON AVERAGE, HOW MANY DAYS PER WEEK DO YOU ENGAGE IN MODERATE TO STRENUOUS EXERCISE (LIKE A BRISK WALK)?: 3 DAYS

## 2025-06-24 SDOH — SOCIAL STABILITY: SOCIAL INSECURITY: ABUSE: ADULT

## 2025-06-24 SDOH — SOCIAL STABILITY: SOCIAL INSECURITY: ARE YOU OR HAVE YOU BEEN THREATENED OR ABUSED PHYSICALLY, EMOTIONALLY, OR SEXUALLY BY ANYONE?: NO

## 2025-06-24 SDOH — SOCIAL STABILITY: SOCIAL INSECURITY: HAVE YOU HAD THOUGHTS OF HARMING ANYONE ELSE?: NO

## 2025-06-24 ASSESSMENT — PAIN SCALES - GENERAL
PAINLEVEL_OUTOF10: 10 - WORST POSSIBLE PAIN
PAINLEVEL_OUTOF10: 7
PAINLEVEL_OUTOF10: 9

## 2025-06-24 ASSESSMENT — ACTIVITIES OF DAILY LIVING (ADL)
GROOMING: INDEPENDENT
ADEQUATE_TO_COMPLETE_ADL: YES
ADEQUATE_TO_COMPLETE_ADL: YES
FEEDING YOURSELF: INDEPENDENT
HEARING - LEFT EAR: FUNCTIONAL
HEARING - RIGHT EAR: FUNCTIONAL
PATIENT'S MEMORY ADEQUATE TO SAFELY COMPLETE DAILY ACTIVITIES?: YES
TOILETING: INDEPENDENT
LACK_OF_TRANSPORTATION: NO
FEEDING YOURSELF: INDEPENDENT
HEARING - RIGHT EAR: FUNCTIONAL
JUDGMENT_ADEQUATE_SAFELY_COMPLETE_DAILY_ACTIVITIES: YES
LACK_OF_TRANSPORTATION: NO
DRESSING YOURSELF: INDEPENDENT
PATIENT'S MEMORY ADEQUATE TO SAFELY COMPLETE DAILY ACTIVITIES?: YES
WALKS IN HOME: INDEPENDENT
DRESSING YOURSELF: INDEPENDENT
JUDGMENT_ADEQUATE_SAFELY_COMPLETE_DAILY_ACTIVITIES: YES
EFFECT OF PAIN ON DAILY ACTIVITIES: YES
GROOMING: INDEPENDENT
WALKS IN HOME: INDEPENDENT
TOILETING: INDEPENDENT
HEARING - LEFT EAR: FUNCTIONAL
BATHING: INDEPENDENT
BATHING: INDEPENDENT

## 2025-06-24 ASSESSMENT — ENCOUNTER SYMPTOMS
HEMATURIA: 0
PALPITATIONS: 0
ARTHRALGIAS: 0
SORE THROAT: 0
NERVOUS/ANXIOUS: 0
SHORTNESS OF BREATH: 0
VOMITING: 0
WOUND: 0
NAUSEA: 0
CHILLS: 0
HEADACHES: 0
EYE PAIN: 0
DIARRHEA: 0
BACK PAIN: 0
FEVER: 0
COUGH: 0
DIZZINESS: 0
ABDOMINAL PAIN: 1
DYSPHORIC MOOD: 0
DYSURIA: 0

## 2025-06-24 ASSESSMENT — LIFESTYLE VARIABLES
AUDIT-C TOTAL SCORE: 4
HOW OFTEN DO YOU HAVE A DRINK CONTAINING ALCOHOL: 2-4 TIMES A MONTH
EVER HAD A DRINK FIRST THING IN THE MORNING TO STEADY YOUR NERVES TO GET RID OF A HANGOVER: NO
HAVE YOU EVER FELT YOU SHOULD CUT DOWN ON YOUR DRINKING: NO
AUDIT-C TOTAL SCORE: 4
HOW MANY STANDARD DRINKS CONTAINING ALCOHOL DO YOU HAVE ON A TYPICAL DAY: 3 OR 4
TOTAL SCORE: 0
HOW OFTEN DO YOU HAVE 6 OR MORE DRINKS ON ONE OCCASION: LESS THAN MONTHLY
AUDIT-C TOTAL SCORE: 2
SKIP TO QUESTIONS 9-10: 1
HAVE PEOPLE ANNOYED YOU BY CRITICIZING YOUR DRINKING: NO
SKIP TO QUESTIONS 9-10: 0
EVER FELT BAD OR GUILTY ABOUT YOUR DRINKING: NO

## 2025-06-24 ASSESSMENT — PAIN DESCRIPTION - LOCATION
LOCATION: ABDOMEN
LOCATION: ABDOMEN

## 2025-06-24 ASSESSMENT — COGNITIVE AND FUNCTIONAL STATUS - GENERAL
MOBILITY SCORE: 24
PATIENT BASELINE BEDBOUND: NO
DAILY ACTIVITIY SCORE: 24

## 2025-06-24 ASSESSMENT — PATIENT HEALTH QUESTIONNAIRE - PHQ9
2. FEELING DOWN, DEPRESSED OR HOPELESS: NOT AT ALL
SUM OF ALL RESPONSES TO PHQ9 QUESTIONS 1 & 2: 0
1. LITTLE INTEREST OR PLEASURE IN DOING THINGS: NOT AT ALL

## 2025-06-24 ASSESSMENT — PAIN - FUNCTIONAL ASSESSMENT
PAIN_FUNCTIONAL_ASSESSMENT: 0-10

## 2025-06-24 ASSESSMENT — PAIN DESCRIPTION - DESCRIPTORS: DESCRIPTORS: SHARP

## 2025-06-24 ASSESSMENT — PAIN DESCRIPTION - PAIN TYPE
TYPE: ACUTE PAIN
TYPE: ACUTE PAIN

## 2025-06-24 NOTE — ED PROVIDER NOTES
HPI   Chief Complaint   Patient presents with    Abdominal Pain       Patient presents with 30 minutes of lower abdominal pain.  States is severe in nature.  Denies any nausea or vomiting.  No trouble urinating.  No change in bowel habits.  No fever or chills.  States earlier today she felt her typical health.      History provided by:  Patient          Patient History   Medical History[1]  Surgical History[2]  Family History[3]  Social History[4]    Physical Exam   ED Triage Vitals [06/24/25 1641]   Temperature Heart Rate Respirations BP   36.4 °C (97.5 °F) 66 20 141/64      Pulse Ox Temp Source Heart Rate Source Patient Position   97 % Temporal Monitor Sitting      BP Location FiO2 (%)     Left arm --       Physical Exam  Vitals and nursing note reviewed.   Constitutional:       General: She is not in acute distress.     Appearance: Normal appearance. She is well-developed, well-groomed and normal weight. She is not ill-appearing, toxic-appearing or diaphoretic.   HENT:      Head: Normocephalic.      Nose: Nose normal.      Mouth/Throat:      Lips: No lesions.      Mouth: Mucous membranes are moist.   Eyes:      General: No scleral icterus.  Cardiovascular:      Rate and Rhythm: Normal rate and regular rhythm.      Pulses:           Dorsalis pedis pulses are 2+ on the right side and 2+ on the left side.        Posterior tibial pulses are 2+ on the right side and 2+ on the left side.      Heart sounds: Normal heart sounds.   Pulmonary:      Effort: Pulmonary effort is normal.      Breath sounds: Normal breath sounds and air entry.   Abdominal:      General: Bowel sounds are normal. There is no distension.      Palpations: Abdomen is soft.      Tenderness: There is abdominal tenderness in the right lower quadrant, suprapubic area and left lower quadrant. There is no right CVA tenderness, left CVA tenderness or guarding. Negative signs include Alvarado's sign and McBurney's sign.   Musculoskeletal:      Right lower  leg: No edema.      Left lower leg: No edema.   Skin:     General: Skin is warm.      Capillary Refill: Capillary refill takes less than 2 seconds.   Neurological:      General: No focal deficit present.      Mental Status: She is alert and oriented to person, place, and time.      GCS: GCS eye subscore is 4. GCS verbal subscore is 5. GCS motor subscore is 6.      Cranial Nerves: No cranial nerve deficit or facial asymmetry.      Motor: No weakness.      Coordination: Coordination is intact.   Psychiatric:         Attention and Perception: Attention and perception normal.         Mood and Affect: Mood is anxious.         Speech: Speech is rapid and pressured.         Behavior: Behavior normal. Behavior is cooperative.         Thought Content: Thought content normal.         Cognition and Memory: Cognition and memory normal.           ED Course & MDM   Diagnoses as of 06/24/25 1933   Obstructive uropathy   Hydronephrosis with urinary obstruction due to renal calculus                 No data recorded                                 Medical Decision Making  Patient presents with 30 minutes of lower abdominal pain.  States is severe in nature.  Denies any nausea or vomiting.  No trouble urinating.  No change in bowel habits.  No fever or chills.  States earlier today she felt her typical health.    Ddx: IBS, diverticulitis, obstructive uropathy, UTI, epiploic appendagitis, other    Will obtain labs and CT  Patient's pain was addressed with IV Dilaudid and Zofran with IV fluids  Troponin was normal.  Lipase normal.  Chemistries show mild ALEJANDRO with a creatinine of 1.33.  This was addressed with IV fluids.  Lactic is normal.  CBC is also normal.  We are currently still waiting on urinalysis.  CT of the abdomen pelvis showed a 6 mm calculus proximal left ureter with moderate left hydronephrosis.  Consulted with urology who would be happy to consult.  Requesting patient is n.p.o. after midnight.  Consult over the hospitalist  for admission with acceptance.    Problems Addressed:  Hydronephrosis with urinary obstruction due to renal calculus: acute illness or injury  Obstructive uropathy: undiagnosed new problem with uncertain prognosis    Amount and/or Complexity of Data Reviewed  Labs: ordered. Decision-making details documented in ED Course.  Radiology: ordered and independent interpretation performed. Decision-making details documented in ED Course.  ECG/medicine tests: ordered and independent interpretation performed. Decision-making details documented in ED Course.     Details: Read by myself and attending showing normal sinus rhythm at a ventricular rate of 76 bpm.  Normal axis.  No ST segment elevation.  NJ interval 144 with a QT of 412        Procedure  Procedures       [1]   Past Medical History:  Diagnosis Date    Other conditions influencing health status 12/01/2015    Cardiac arrest during or resulting from a procedure   [2]   Past Surgical History:  Procedure Laterality Date    APPENDECTOMY  03/25/2014    Appendectomy    COLONOSCOPY  03/25/2014    Complete Colonoscopy    FOOT SURGERY  12/01/2015    Foot Surgery Right    TOTAL HIP ARTHROPLASTY  12/01/2015    Total Hip Replacement   [3] No family history on file.  [4]   Social History  Tobacco Use    Smoking status: Not on file    Smokeless tobacco: Not on file   Substance Use Topics    Alcohol use: Not on file    Drug use: Not on file        Lana Wren PA-C  06/24/25 1933

## 2025-06-24 NOTE — H&P
Chief Complaint  Abdominal pain    History Of Present Illness  Sosa Noland is a 69 y.o. female with a history of kidney stones, chronic kidney disease, complex renal cyst, generalized anxiety disorder, GERD, gastroparesis osteoarthritis, hyperlipidemia, hypothyroidism, irritable bowel syndrome, lumbar degenerative disc disease, obstructive sleep apnea, osteopenia, prediabetes, and hypertension.  She presented to the emergency department on 6/24 complaining of sudden onset lower abdominal pain starting 30 minutes prior to arrival.  She noted it was severe in intensity.  She denied trouble urinating or recent changes in bowel movements.  She noted she felt at her baseline earlier in the day.  Vital signs on arrival to the emergency department were unremarkable.  She reported having 1 prior kidney stone 9 years ago that was smaller in size and she was able to pass on her own.  She reported this kidney stone was a known problem and was being followed by nephrology, nonobstructive.  She also has a renal cyst that is being followed by nephrology.    Laboratory evaluation in the emergency department was notable for glucose 110 and creatinine 1.33.  Of note, patient just had an A1c performed on 6/16 that was 6.4%.  Electrolytes, LFTs, lipase, lactic acid, troponin, and CBC were unremarkable.  CT scan of the abdomen pelvis with IV contrast was obtained and showed a 6 mm calculus in the proximal left ureter with moderate left hydronephrosis.  Case was discussed with urology, Dr. Altamirano.  Plan for admission with n.p.o. after midnight for potential intervention tomorrow.  Therapeutic interventions while in the ED included 500 mL bolus of 0.9% normal saline, Zofran 4 mg IV x 1, and Dilaudid 0.5 mg IV x 2.     Past Medical History  She has a past medical history of Other conditions influencing health status (12/01/2015). Kidney stones, chronic kidney disease, complex renal cyst, generalized anxiety disorder, GERD,  gastroparesis osteoarthritis, hyperlipidemia, hypothyroidism, irritable bowel syndrome, lumbar degenerative disc disease, obstructive sleep apnea, osteopenia, prediabetes, and hypertension.    Surgical History  She has a past surgical history that includes Appendectomy (03/25/2014); Colonoscopy (03/25/2014); Foot surgery (12/01/2015); and Total hip arthroplasty (12/01/2015).     Social History  She has no history on file for tobacco use, alcohol use, and drug use.    Family History  Family history reviewed and noncontributory    Allergies  Ultracef    Review of Systems   Constitutional:  Negative for chills and fever.   HENT:  Negative for postnasal drip and sore throat.    Eyes:  Negative for pain and visual disturbance.   Respiratory:  Negative for cough and shortness of breath.    Cardiovascular:  Negative for chest pain, palpitations and leg swelling.   Gastrointestinal:  Positive for abdominal pain. Negative for diarrhea, nausea and vomiting.   Genitourinary:  Negative for dysuria and hematuria.   Musculoskeletal:  Negative for arthralgias and back pain.   Skin:  Negative for rash and wound.   Neurological:  Negative for dizziness and headaches.   Psychiatric/Behavioral:  Negative for dysphoric mood. The patient is not nervous/anxious.         Physical Exam  Vitals and nursing note reviewed.   Constitutional:       General: She is in acute distress (appears in pain).      Appearance: She is ill-appearing.   HENT:      Head: Normocephalic and atraumatic.      Right Ear: External ear normal.      Left Ear: External ear normal.      Nose: Nose normal. No rhinorrhea.      Mouth/Throat:      Mouth: Mucous membranes are moist.      Pharynx: Oropharynx is clear. No oropharyngeal exudate.   Eyes:      General: No scleral icterus.        Right eye: No discharge.         Left eye: No discharge.      Conjunctiva/sclera: Conjunctivae normal.   Cardiovascular:      Rate and Rhythm: Normal rate and regular rhythm.       Pulses: Normal pulses.      Heart sounds: Normal heart sounds. No murmur heard.  Pulmonary:      Effort: Pulmonary effort is normal. No respiratory distress.      Breath sounds: Normal breath sounds. No wheezing or rales.   Abdominal:      General: Abdomen is flat. There is no distension.      Palpations: Abdomen is soft.      Tenderness: There is abdominal tenderness. There is no guarding or rebound.   Musculoskeletal:         General: No tenderness.      Right lower leg: No edema.      Left lower leg: No edema.   Skin:     General: Skin is warm and dry.      Capillary Refill: Capillary refill takes less than 2 seconds.      Findings: No rash.   Neurological:      General: No focal deficit present.      Mental Status: She is alert and oriented to person, place, and time. Mental status is at baseline.   Psychiatric:         Mood and Affect: Mood normal.         Behavior: Behavior normal.         Thought Content: Thought content normal.         Judgment: Judgment normal.          Last Recorded Vitals  /64 (BP Location: Left arm, Patient Position: Sitting)   Pulse 66   Temp 36.4 °C (97.5 °F) (Temporal)   Resp 20   Wt 54.4 kg (120 lb)   SpO2 97%     Relevant Results        Results for orders placed or performed during the hospital encounter of 06/24/25 (from the past 24 hours)   CBC and Auto Differential   Result Value Ref Range    WBC 5.3 4.4 - 11.3 x10*3/uL    nRBC 0.0 0.0 - 0.0 /100 WBCs    RBC 4.22 4.00 - 5.20 x10*6/uL    Hemoglobin 12.9 12.0 - 16.0 g/dL    Hematocrit 38.6 36.0 - 46.0 %    MCV 92 80 - 100 fL    MCH 30.6 26.0 - 34.0 pg    MCHC 33.4 32.0 - 36.0 g/dL    RDW 13.3 11.5 - 14.5 %    Platelets 206 150 - 450 x10*3/uL    Neutrophils % 60.4 40.0 - 80.0 %    Immature Granulocytes %, Automated 0.4 0.0 - 0.9 %    Lymphocytes % 29.4 13.0 - 44.0 %    Monocytes % 7.9 2.0 - 10.0 %    Eosinophils % 1.5 0.0 - 6.0 %    Basophils % 0.4 0.0 - 2.0 %    Neutrophils Absolute 3.23 1.60 - 5.50 x10*3/uL    Immature  Granulocytes Absolute, Automated 0.02 0.00 - 0.50 x10*3/uL    Lymphocytes Absolute 1.57 0.80 - 3.00 x10*3/uL    Monocytes Absolute 0.42 0.05 - 0.80 x10*3/uL    Eosinophils Absolute 0.08 0.00 - 0.40 x10*3/uL    Basophils Absolute 0.02 0.00 - 0.10 x10*3/uL   Comprehensive metabolic panel   Result Value Ref Range    Glucose 110 (H) 74 - 99 mg/dL    Sodium 138 136 - 145 mmol/L    Potassium 4.0 3.5 - 5.3 mmol/L    Chloride 105 98 - 107 mmol/L    Bicarbonate 22 21 - 32 mmol/L    Anion Gap 15 10 - 20 mmol/L    Urea Nitrogen 21 6 - 23 mg/dL    Creatinine 1.33 (H) 0.50 - 1.05 mg/dL    eGFR 43 (L) >60 mL/min/1.73m*2    Calcium 9.4 8.6 - 10.3 mg/dL    Albumin 4.7 3.4 - 5.0 g/dL    Alkaline Phosphatase 64 33 - 136 U/L    Total Protein 7.0 6.4 - 8.2 g/dL    AST 30 9 - 39 U/L    Bilirubin, Total 0.8 0.0 - 1.2 mg/dL    ALT 17 7 - 45 U/L   Lipase   Result Value Ref Range    Lipase 22 9 - 82 U/L   Lactate   Result Value Ref Range    Lactate 1.2 0.4 - 2.0 mmol/L   Troponin I, High Sensitivity   Result Value Ref Range    Troponin I, High Sensitivity 3 0 - 13 ng/L   ECG 12 lead   Result Value Ref Range    Ventricular Rate 76 BPM    Atrial Rate 76 BPM    WY Interval 144 ms    QRS Duration 78 ms    QT Interval 412 ms    QTC Calculation(Bazett) 463 ms    P Axis -21 degrees    R Axis 74 degrees    T Axis 70 degrees    QRS Count 12 beats    Q Onset 221 ms    P Onset 149 ms    P Offset 177 ms    T Offset 427 ms    QTC Fredericia 445 ms         CT abdomen pelvis w IV contrast  Result Date: 6/24/2025  Interpreted By:  Nabil Pride, STUDY: CT ABDOMEN PELVIS W IV CONTRAST;  6/24/2025 5:56 pm   INDICATION: Signs/Symptoms:pain.     COMPARISON: CT abdomen and pelvis 08/13/2014   ACCESSION NUMBER(S): [id number]   ORDERING CLINICIAN: ISSA EARLY   TECHNIQUE: Contiguous axial images of the abdomen and pelvis were obtained after the intravenous administration of 75 mL Omnipaque 350 contrast. Coronal and sagittal reformatted images were reconstructed  from the axial data.   FINDINGS: LOWER CHEST: No acute abnormality.   LIVER: Multiple hepatic cysts and additional subcentimeter hypodensities too small to fully characterize.   BILE DUCTS: No significant intrahepatic or extrahepatic dilatation.   GALLBLADDER: No significant abnormality.   PANCREAS: No significant abnormality.   SPLEEN: No significant abnormality.   ADRENALS: No significant abnormality.   KIDNEYS, URETERS, BLADDER: 4.7 cm left renal cyst. Additional bilateral renal hypodensities, too small to fully characterize. Moderate left hydroureteronephrosis to the level of the 6 mm calculus in the proximal left ureter (series 203, image 46). Scattered additional nonobstructing left renal calculi on the order of 2-3 mm. No gross bladder abnormality although assessment limited by streak artifact from bilateral hip prostheses.   REPRODUCTIVE ORGANS: Limited assessment due to streak artifact.   GI: Status post Maria Victoria-en-Y gastric bypass. No bowel obstruction. No appreciable bowel inflammation. The appendix is not discretely visualized. Moderate colonic stool burden.   VESSELS: No significant abnormality. The portal veins and IVC are patent.   PERITONEUM/RETROPERITONEUM: No ascites, free air, or fluid collection.   LYMPH NODES: No enlarged lymph nodes.   ABDOMINAL WALL: No significant abnormality.   OSSEOUS STRUCTURES: No acute osseous abnormality. Bilateral total hip arthroplasties.       1. 6 mm calculus in the proximal left ureter with moderate left hydronephrosis.   MACRO: None   Signed by: Nabil Pride 6/24/2025 6:17 PM Dictation workstation:   [id number]    ECG 12 lead  Result Date: 6/24/2025  Normal sinus rhythm Normal ECG When compared with ECG of 16-FEB-2020 18:38, No significant change was found See ED provider note for full interpretation and clinical correlation         Assessment/Plan     Obstructive Ureterolithiasis on Left  Moderate Left Hydronephrosis  - Urinalysis pending, await findings  determine if antibiotic coverage is needed  - Continue hydration with 0.9% normal saline at 125 mL/h overnight  -Start tamsulosin, first dose now  -Strain all urine  -Formally consult urology for evaluation and keep n.p.o. after midnight  - Continue pain control with hydromorphone and nausea control with ondansetron  -Continuous pulse oximetry while on IV narcotics    Prediabetes  - Suggested by most recent hemoglobin A1c  - No indication for sliding scale insulin currently but monitor sugars    Acute Kidney Injury on Chronic Kidney Disease  - Patient has baseline creatinine around 1.05 corresponding to stage II chronic kidney disease.  Creatinine on presentation was mildly elevated at 1.33.  -Likely related to hydronephrosis and acute response.  -Hydrate as noted above and address kidney stone as noted  -Check urine protein to creatinine ratio, urinalysis, and urine electrolytes  - Continue to trend periprocedure renal function    Hyperlipidemia  - Continue statin once medications reconciled    Hypothyroidism  - Continue home levothyroxine and liothyronine once medications reconciled    Depression/Anxiety  - Continue home sertraline and doxepin once medications reconciled    Await reconciliation of all medications and restart as appropriate.     Patient elects to be a full code. Significant Other Mendoza Ruiz identified as primary contact in case of deblity or inability for self-advocacy.          Roni Tubbs MD

## 2025-06-25 ENCOUNTER — APPOINTMENT (OUTPATIENT)
Dept: RADIOLOGY | Facility: HOSPITAL | Age: 70
DRG: 660 | End: 2025-06-25
Payer: MEDICARE

## 2025-06-25 ENCOUNTER — SURGERY (OUTPATIENT)
Age: 70
End: 2025-06-25
Payer: MEDICARE

## 2025-06-25 ENCOUNTER — ANESTHESIA (OUTPATIENT)
Dept: OPERATING ROOM | Facility: HOSPITAL | Age: 70
DRG: 660 | End: 2025-06-25
Payer: MEDICARE

## 2025-06-25 ENCOUNTER — ANESTHESIA EVENT (OUTPATIENT)
Dept: OPERATING ROOM | Facility: HOSPITAL | Age: 70
DRG: 660 | End: 2025-06-25
Payer: MEDICARE

## 2025-06-25 PROBLEM — N20.0 NEPHROLITHIASIS: Status: ACTIVE | Noted: 2025-06-25

## 2025-06-25 PROBLEM — N20.1 CALCULUS OF URETER: Status: ACTIVE | Noted: 2025-06-24

## 2025-06-25 LAB
ALBUMIN SERPL BCP-MCNC: 3.8 G/DL (ref 3.4–5)
ALP SERPL-CCNC: 56 U/L (ref 33–136)
ALT SERPL W P-5'-P-CCNC: 12 U/L (ref 7–45)
ANION GAP SERPL CALC-SCNC: 11 MMOL/L (ref 10–20)
AST SERPL W P-5'-P-CCNC: 21 U/L (ref 9–39)
BASOPHILS # BLD AUTO: 0.02 X10*3/UL (ref 0–0.1)
BASOPHILS NFR BLD AUTO: 0.4 %
BILIRUB SERPL-MCNC: 0.6 MG/DL (ref 0–1.2)
BUN SERPL-MCNC: 20 MG/DL (ref 6–23)
CALCIUM SERPL-MCNC: 8.6 MG/DL (ref 8.6–10.3)
CHLORIDE SERPL-SCNC: 107 MMOL/L (ref 98–107)
CO2 SERPL-SCNC: 23 MMOL/L (ref 21–32)
CREAT SERPL-MCNC: 1.56 MG/DL (ref 0.5–1.05)
EGFRCR SERPLBLD CKD-EPI 2021: 35 ML/MIN/1.73M*2
EOSINOPHIL # BLD AUTO: 0.09 X10*3/UL (ref 0–0.4)
EOSINOPHIL NFR BLD AUTO: 1.7 %
ERYTHROCYTE [DISTWIDTH] IN BLOOD BY AUTOMATED COUNT: 13.3 % (ref 11.5–14.5)
GLUCOSE SERPL-MCNC: 96 MG/DL (ref 74–99)
HCT VFR BLD AUTO: 37.7 % (ref 36–46)
HGB BLD-MCNC: 12.2 G/DL (ref 12–16)
HOLD SPECIMEN: NORMAL
HOLD SPECIMEN: NORMAL
IMM GRANULOCYTES # BLD AUTO: 0.02 X10*3/UL (ref 0–0.5)
IMM GRANULOCYTES NFR BLD AUTO: 0.4 % (ref 0–0.9)
LYMPHOCYTES # BLD AUTO: 1.21 X10*3/UL (ref 0.8–3)
LYMPHOCYTES NFR BLD AUTO: 23 %
MAGNESIUM SERPL-MCNC: 1.78 MG/DL (ref 1.6–2.4)
MCH RBC QN AUTO: 30.3 PG (ref 26–34)
MCHC RBC AUTO-ENTMCNC: 32.4 G/DL (ref 32–36)
MCV RBC AUTO: 94 FL (ref 80–100)
MONOCYTES # BLD AUTO: 0.48 X10*3/UL (ref 0.05–0.8)
MONOCYTES NFR BLD AUTO: 9.1 %
NEUTROPHILS # BLD AUTO: 3.43 X10*3/UL (ref 1.6–5.5)
NEUTROPHILS NFR BLD AUTO: 65.4 %
NRBC BLD-RTO: 0 /100 WBCS (ref 0–0)
PLATELET # BLD AUTO: 174 X10*3/UL (ref 150–450)
POTASSIUM SERPL-SCNC: 4 MMOL/L (ref 3.5–5.3)
PROT SERPL-MCNC: 5.8 G/DL (ref 6.4–8.2)
RBC # BLD AUTO: 4.02 X10*6/UL (ref 4–5.2)
SODIUM SERPL-SCNC: 137 MMOL/L (ref 136–145)
WBC # BLD AUTO: 5.3 X10*3/UL (ref 4.4–11.3)

## 2025-06-25 PROCEDURE — 74420 UROGRAPHY RTRGR +-KUB: CPT | Performed by: UROLOGY

## 2025-06-25 PROCEDURE — 2500000004 HC RX 250 GENERAL PHARMACY W/ HCPCS (ALT 636 FOR OP/ED): Performed by: INTERNAL MEDICINE

## 2025-06-25 PROCEDURE — 3600000003 HC OR TIME - INITIAL BASE CHARGE - PROCEDURE LEVEL THREE: Performed by: UROLOGY

## 2025-06-25 PROCEDURE — 3700000001 HC GENERAL ANESTHESIA TIME - INITIAL BASE CHARGE: Performed by: UROLOGY

## 2025-06-25 PROCEDURE — 2500000004 HC RX 250 GENERAL PHARMACY W/ HCPCS (ALT 636 FOR OP/ED): Mod: JZ | Performed by: INTERNAL MEDICINE

## 2025-06-25 PROCEDURE — 83735 ASSAY OF MAGNESIUM: CPT | Performed by: INTERNAL MEDICINE

## 2025-06-25 PROCEDURE — 36415 COLL VENOUS BLD VENIPUNCTURE: CPT | Performed by: INTERNAL MEDICINE

## 2025-06-25 PROCEDURE — 2500000001 HC RX 250 WO HCPCS SELF ADMINISTERED DRUGS (ALT 637 FOR MEDICARE OP): Performed by: INTERNAL MEDICINE

## 2025-06-25 PROCEDURE — 2500000001 HC RX 250 WO HCPCS SELF ADMINISTERED DRUGS (ALT 637 FOR MEDICARE OP): Performed by: UROLOGY

## 2025-06-25 PROCEDURE — 52330 CYSTOSCOPY AND TREATMENT: CPT | Performed by: UROLOGY

## 2025-06-25 PROCEDURE — 3700000002 HC GENERAL ANESTHESIA TIME - EACH INCREMENTAL 1 MINUTE: Performed by: UROLOGY

## 2025-06-25 PROCEDURE — C1769 GUIDE WIRE: HCPCS | Performed by: UROLOGY

## 2025-06-25 PROCEDURE — 3600000008 HC OR TIME - EACH INCREMENTAL 1 MINUTE - PROCEDURE LEVEL THREE: Performed by: UROLOGY

## 2025-06-25 PROCEDURE — 52332 CYSTOSCOPY AND TREATMENT: CPT | Performed by: UROLOGY

## 2025-06-25 PROCEDURE — 2500000004 HC RX 250 GENERAL PHARMACY W/ HCPCS (ALT 636 FOR OP/ED): Mod: JZ | Performed by: UROLOGY

## 2025-06-25 PROCEDURE — C2617 STENT, NON-COR, TEM W/O DEL: HCPCS | Performed by: UROLOGY

## 2025-06-25 PROCEDURE — 2500000004 HC RX 250 GENERAL PHARMACY W/ HCPCS (ALT 636 FOR OP/ED): Performed by: UROLOGY

## 2025-06-25 PROCEDURE — 2500000005 HC RX 250 GENERAL PHARMACY W/O HCPCS: Performed by: UROLOGY

## 2025-06-25 PROCEDURE — 2720000007 HC OR 272 NO HCPCS: Performed by: UROLOGY

## 2025-06-25 PROCEDURE — 99232 SBSQ HOSP IP/OBS MODERATE 35: CPT | Performed by: INTERNAL MEDICINE

## 2025-06-25 PROCEDURE — 2500000004 HC RX 250 GENERAL PHARMACY W/ HCPCS (ALT 636 FOR OP/ED)

## 2025-06-25 PROCEDURE — C1758 CATHETER, URETERAL: HCPCS | Performed by: UROLOGY

## 2025-06-25 PROCEDURE — 99222 1ST HOSP IP/OBS MODERATE 55: CPT

## 2025-06-25 PROCEDURE — 1210000001 HC SEMI-PRIVATE ROOM DAILY

## 2025-06-25 PROCEDURE — 2500000002 HC RX 250 W HCPCS SELF ADMINISTERED DRUGS (ALT 637 FOR MEDICARE OP, ALT 636 FOR OP/ED): Performed by: INTERNAL MEDICINE

## 2025-06-25 PROCEDURE — G0378 HOSPITAL OBSERVATION PER HR: HCPCS

## 2025-06-25 PROCEDURE — 2550000001 HC RX 255 CONTRASTS: Performed by: UROLOGY

## 2025-06-25 PROCEDURE — 85025 COMPLETE CBC W/AUTO DIFF WBC: CPT | Performed by: INTERNAL MEDICINE

## 2025-06-25 PROCEDURE — 84075 ASSAY ALKALINE PHOSPHATASE: CPT | Performed by: INTERNAL MEDICINE

## 2025-06-25 PROCEDURE — 74420 UROGRAPHY RTRGR +-KUB: CPT

## 2025-06-25 PROCEDURE — 2780000003 HC OR 278 NO HCPCS: Performed by: UROLOGY

## 2025-06-25 DEVICE — INLAY OPTIMA URETERAL STENT W/O GUIDEWIRE
Type: IMPLANTABLE DEVICE | Site: URETER | Status: FUNCTIONAL
Brand: BARD® INLAY OPTIMA® URETERAL STENT

## 2025-06-25 RX ORDER — ALBUTEROL SULFATE 0.83 MG/ML
2.5 SOLUTION RESPIRATORY (INHALATION) ONCE AS NEEDED
Status: CANCELLED | OUTPATIENT
Start: 2025-06-25

## 2025-06-25 RX ORDER — LABETALOL HYDROCHLORIDE 5 MG/ML
5 INJECTION, SOLUTION INTRAVENOUS ONCE AS NEEDED
Status: CANCELLED | OUTPATIENT
Start: 2025-06-25

## 2025-06-25 RX ORDER — PROPOFOL 10 MG/ML
INJECTION, EMULSION INTRAVENOUS AS NEEDED
Status: DISCONTINUED | OUTPATIENT
Start: 2025-06-25 | End: 2025-06-25

## 2025-06-25 RX ORDER — FENTANYL CITRATE 50 UG/ML
25 INJECTION, SOLUTION INTRAMUSCULAR; INTRAVENOUS EVERY 5 MIN PRN
Refills: 0 | Status: CANCELLED | OUTPATIENT
Start: 2025-06-25

## 2025-06-25 RX ORDER — SODIUM CHLORIDE, SODIUM LACTATE, POTASSIUM CHLORIDE, CALCIUM CHLORIDE 600; 310; 30; 20 MG/100ML; MG/100ML; MG/100ML; MG/100ML
INJECTION, SOLUTION INTRAVENOUS CONTINUOUS PRN
Status: DISCONTINUED | OUTPATIENT
Start: 2025-06-25 | End: 2025-06-25

## 2025-06-25 RX ORDER — LIDOCAINE HYDROCHLORIDE 10 MG/ML
0.1 INJECTION, SOLUTION EPIDURAL; INFILTRATION; INTRACAUDAL; PERINEURAL ONCE
Status: CANCELLED | OUTPATIENT
Start: 2025-06-25 | End: 2025-06-25

## 2025-06-25 RX ORDER — OXYCODONE HYDROCHLORIDE 5 MG/1
10 TABLET ORAL EVERY 4 HOURS PRN
Refills: 0 | Status: CANCELLED | OUTPATIENT
Start: 2025-06-25

## 2025-06-25 RX ORDER — ONDANSETRON HYDROCHLORIDE 2 MG/ML
4 INJECTION, SOLUTION INTRAVENOUS ONCE AS NEEDED
Status: CANCELLED | OUTPATIENT
Start: 2025-06-25

## 2025-06-25 RX ORDER — ACETAMINOPHEN 325 MG/1
650 TABLET ORAL EVERY 4 HOURS PRN
Status: CANCELLED | OUTPATIENT
Start: 2025-06-25

## 2025-06-25 RX ORDER — HYDRALAZINE HYDROCHLORIDE 20 MG/ML
5 INJECTION INTRAMUSCULAR; INTRAVENOUS EVERY 30 MIN PRN
Status: CANCELLED | OUTPATIENT
Start: 2025-06-25

## 2025-06-25 RX ORDER — LIDOCAINE HYDROCHLORIDE 20 MG/ML
JELLY TOPICAL AS NEEDED
Status: DISCONTINUED | OUTPATIENT
Start: 2025-06-25 | End: 2025-06-25 | Stop reason: HOSPADM

## 2025-06-25 RX ORDER — FENTANYL CITRATE 50 UG/ML
INJECTION, SOLUTION INTRAMUSCULAR; INTRAVENOUS AS NEEDED
Status: DISCONTINUED | OUTPATIENT
Start: 2025-06-25 | End: 2025-06-25

## 2025-06-25 RX ORDER — FENTANYL CITRATE 50 UG/ML
12.5 INJECTION, SOLUTION INTRAMUSCULAR; INTRAVENOUS EVERY 5 MIN PRN
Refills: 0 | Status: CANCELLED | OUTPATIENT
Start: 2025-06-25

## 2025-06-25 RX ORDER — OXYCODONE HYDROCHLORIDE 5 MG/1
5 TABLET ORAL EVERY 4 HOURS PRN
Refills: 0 | Status: CANCELLED | OUTPATIENT
Start: 2025-06-25

## 2025-06-25 RX ORDER — MIDAZOLAM HYDROCHLORIDE 1 MG/ML
INJECTION, SOLUTION INTRAMUSCULAR; INTRAVENOUS AS NEEDED
Status: DISCONTINUED | OUTPATIENT
Start: 2025-06-25 | End: 2025-06-25

## 2025-06-25 RX ORDER — SODIUM CHLORIDE 0.9 G/100ML
INJECTION, SOLUTION IRRIGATION AS NEEDED
Status: DISCONTINUED | OUTPATIENT
Start: 2025-06-25 | End: 2025-06-25 | Stop reason: HOSPADM

## 2025-06-25 RX ORDER — METOCLOPRAMIDE HYDROCHLORIDE 5 MG/ML
10 INJECTION INTRAMUSCULAR; INTRAVENOUS ONCE AS NEEDED
Status: CANCELLED | OUTPATIENT
Start: 2025-06-25

## 2025-06-25 RX ORDER — CIPROFLOXACIN 2 MG/ML
400 INJECTION, SOLUTION INTRAVENOUS EVERY 12 HOURS
Status: DISCONTINUED | OUTPATIENT
Start: 2025-06-25 | End: 2025-06-26 | Stop reason: HOSPADM

## 2025-06-25 RX ORDER — SODIUM CHLORIDE, SODIUM LACTATE, POTASSIUM CHLORIDE, CALCIUM CHLORIDE 600; 310; 30; 20 MG/100ML; MG/100ML; MG/100ML; MG/100ML
100 INJECTION, SOLUTION INTRAVENOUS CONTINUOUS
Status: CANCELLED | OUTPATIENT
Start: 2025-06-25 | End: 2025-06-26

## 2025-06-25 RX ORDER — ONDANSETRON HYDROCHLORIDE 2 MG/ML
INJECTION, SOLUTION INTRAVENOUS AS NEEDED
Status: DISCONTINUED | OUTPATIENT
Start: 2025-06-25 | End: 2025-06-25

## 2025-06-25 RX ORDER — LIDOCAINE HYDROCHLORIDE 20 MG/ML
INJECTION, SOLUTION INFILTRATION; PERINEURAL AS NEEDED
Status: DISCONTINUED | OUTPATIENT
Start: 2025-06-25 | End: 2025-06-25

## 2025-06-25 RX ADMIN — TAMSULOSIN HYDROCHLORIDE 0.4 MG: 0.4 CAPSULE ORAL at 09:05

## 2025-06-25 RX ADMIN — IOHEXOL 2.5 ML: 350 INJECTION, SOLUTION INTRAVENOUS at 17:04

## 2025-06-25 RX ADMIN — ONDANSETRON 4 MG: 2 INJECTION INTRAMUSCULAR; INTRAVENOUS at 17:05

## 2025-06-25 RX ADMIN — ATORVASTATIN CALCIUM 80 MG: 80 TABLET, FILM COATED ORAL at 21:23

## 2025-06-25 RX ADMIN — LIOTHYRONINE SODIUM 10 MCG: 5 TABLET ORAL at 09:08

## 2025-06-25 RX ADMIN — SODIUM CHLORIDE 1000 ML: 900 IRRIGANT IRRIGATION at 17:02

## 2025-06-25 RX ADMIN — PROPOFOL 50 MG: 10 INJECTION, EMULSION INTRAVENOUS at 16:52

## 2025-06-25 RX ADMIN — PANTOPRAZOLE SODIUM 40 MG: 40 TABLET, DELAYED RELEASE ORAL at 21:23

## 2025-06-25 RX ADMIN — SODIUM CHLORIDE, POTASSIUM CHLORIDE, SODIUM LACTATE AND CALCIUM CHLORIDE: 600; 310; 30; 20 INJECTION, SOLUTION INTRAVENOUS at 16:43

## 2025-06-25 RX ADMIN — METRONIDAZOLE 250 MG: 250 TABLET ORAL at 21:30

## 2025-06-25 RX ADMIN — DOXEPIN HYDROCHLORIDE 20 MG: 10 CAPSULE ORAL at 00:02

## 2025-06-25 RX ADMIN — LIDOCAINE HYDROCHLORIDE 3 ML: 20 INJECTION, SOLUTION INFILTRATION; PERINEURAL at 16:52

## 2025-06-25 RX ADMIN — HYDROMORPHONE HYDROCHLORIDE 0.5 MG: 1 INJECTION, SOLUTION INTRAMUSCULAR; INTRAVENOUS; SUBCUTANEOUS at 09:12

## 2025-06-25 RX ADMIN — HYDROMORPHONE HYDROCHLORIDE 0.5 MG: 1 INJECTION, SOLUTION INTRAMUSCULAR; INTRAVENOUS; SUBCUTANEOUS at 12:30

## 2025-06-25 RX ADMIN — DICYCLOMINE HYDROCHLORIDE 20 MG: 10 CAPSULE ORAL at 21:24

## 2025-06-25 RX ADMIN — SERTRALINE HYDROCHLORIDE 100 MG: 50 TABLET, FILM COATED ORAL at 09:05

## 2025-06-25 RX ADMIN — TAMSULOSIN HYDROCHLORIDE 0.4 MG: 0.4 CAPSULE ORAL at 00:02

## 2025-06-25 RX ADMIN — PROPOFOL 100 MCG/KG/MIN: 10 INJECTION, EMULSION INTRAVENOUS at 16:53

## 2025-06-25 RX ADMIN — LEVOTHYROXINE SODIUM 112 MCG: 112 TABLET ORAL at 06:33

## 2025-06-25 RX ADMIN — FENTANYL CITRATE 25 MCG: 50 INJECTION INTRAMUSCULAR; INTRAVENOUS at 16:59

## 2025-06-25 RX ADMIN — CIPROFLOXACIN 400 MG: 400 INJECTION, SOLUTION INTRAVENOUS at 23:58

## 2025-06-25 RX ADMIN — ATORVASTATIN CALCIUM 80 MG: 80 TABLET, FILM COATED ORAL at 00:02

## 2025-06-25 RX ADMIN — HYDROMORPHONE HYDROCHLORIDE 0.5 MG: 1 INJECTION, SOLUTION INTRAMUSCULAR; INTRAVENOUS; SUBCUTANEOUS at 21:46

## 2025-06-25 RX ADMIN — CIPROFLOXACIN 400 MG: 400 INJECTION, SOLUTION INTRAVENOUS at 12:30

## 2025-06-25 RX ADMIN — DOXEPIN HYDROCHLORIDE 20 MG: 10 CAPSULE ORAL at 21:24

## 2025-06-25 RX ADMIN — MIDAZOLAM 2 MG: 1 INJECTION INTRAMUSCULAR; INTRAVENOUS at 16:35

## 2025-06-25 RX ADMIN — LIDOCAINE HYDROCHLORIDE 1 APPLICATION: 20 JELLY TOPICAL at 17:06

## 2025-06-25 SDOH — HEALTH STABILITY: MENTAL HEALTH: CURRENT SMOKER: 0

## 2025-06-25 ASSESSMENT — COGNITIVE AND FUNCTIONAL STATUS - GENERAL
DAILY ACTIVITIY SCORE: 24
MOBILITY SCORE: 24
MOBILITY SCORE: 24
DAILY ACTIVITIY SCORE: 24

## 2025-06-25 ASSESSMENT — ENCOUNTER SYMPTOMS
FREQUENCY: 0
HEMATURIA: 0
FEVER: 0
CHILLS: 0
DIFFICULTY URINATING: 0
VOMITING: 0
FLANK PAIN: 0
DYSURIA: 0
ABDOMINAL PAIN: 1

## 2025-06-25 ASSESSMENT — PAIN SCALES - GENERAL
PAINLEVEL_OUTOF10: 7
PAINLEVEL_OUTOF10: 4
PAINLEVEL_OUTOF10: 8
PAINLEVEL_OUTOF10: 3

## 2025-06-25 ASSESSMENT — PAIN - FUNCTIONAL ASSESSMENT
PAIN_FUNCTIONAL_ASSESSMENT: 0-10

## 2025-06-25 ASSESSMENT — PAIN DESCRIPTION - LOCATION: LOCATION: ABDOMEN

## 2025-06-25 ASSESSMENT — PAIN DESCRIPTION - DESCRIPTORS: DESCRIPTORS: ACHING;BURNING

## 2025-06-25 NOTE — OP NOTE
CYSTOSCOPY, WITH URETERAL STENT INSERTION (L) Operative Note     Date: 2025 - 2025  OR Location: [facility]    Name: Sosa Noland, : 1955, Age: 69 y.o., MRN: [id number], Sex: female    Diagnosis  Pre-op Diagnosis      * Calculus of ureter [N20.1]     * Acute kidney injury superimposed on chronic kidney disease [N17.9, N18.9] Post-op Diagnosis     * Calculus of ureter [N20.1]     * Acute kidney injury superimposed on chronic kidney disease [N17.9, N18.9]     Procedures  CYSTOSCOPY, WITH URETERAL STENT INSERTION  40760 - MS CYSTO W/INSERT URETERAL STENT      Surgeons      * Alli Wong - Primary    Resident/Fellow/Other Assistant:  Surgeons and Role:  * No surgeons found with a matching role *    Staff:   Circulator: Mariana Toure Person: Cheyanne    Anesthesia Staff: Anesthesiologist: Emili Faye MD  CRNA: KP Rice-REJI, DNAP    Procedure Summary  Anesthesia: General  ASA: II  Estimated Blood Loss: 3mL  Intra-op Medications:   Administrations occurring from 1600 to 1655 on 25:   Medication Name Total Dose   LR infusion Cannot be calculated   lidocaine (Xylocaine) 2 % 3 mL   midazolam (Versed) 1 mg/1 mL 2 mg   propofol (Diprivan) injection 10 mg/mL 61.78 mg              Anesthesia Record               Intraprocedure I/O Totals          Intake    LR infusion 600.00 mL    Total Intake 600 mL          Specimen: No specimens collected              Drains and/or Catheters: * None in log *    Tourniquet Times:         Implants:  Implants       Type Name Action Serial No.      Stent STENT, INLAY OPTIMA, 6FR X 26CM - [id number] Implanted             Operative Indications: 69-year-old female currently admitted with a 6 mm left proximal ureteral stone with hydronephrosis, pain and acute kidney injury with UA suggestive of possible urinary tract infection.    Operative Findings: Radiopaque stone left proximal ureter, difficult to pass but with careful manipulation of the 5 [place]  open-ended catheter and wire I was able to advance the wire into the kidney.  Stent placed, 6 Thai by 26 cm.    Operative Procedure:   The patient was correctly identified and the operative plan was confirmed with the patient and the operative team. A weight appropriate dose of prophylactic antibiotics was administered intravenously prior to the procedure and sequential compression devices were applied to the lower extremities and activated prior to induction of anesthesia. The patient was placed in supine position. General anesthesia was induced. The patient was repositioned in dorsal lithotomy position. All pressure points were padded per protocol and the operative area was prepped and draped in the usual sterile fashion.    A 16 Thai flexible cystoscope was inserted atraumatically and the bladder was inspected.  The ureteral orifice ease were noted to be in orthotopic position bilaterally.  There were no suspicious lesions, tumors, stones, blood within the bladder.  The left ureteral orifice was cannulated with a sensor wire which was passed into the ureter over this a 5 Thai open-ended catheter was advanced and the wire was removed.  Retrograde pyelogram was performed revealing a radiopaque stone with a filling defect in the left proximal ureter with proximal hydronephrosis.  The wire was then replaced, however it was quite difficult to advance the wire into the kidney and required multiple manipulations.  The wire kept getting hung up at the stone, by advancing the 5 Thai and then manipulating the stone and wire was able to advance this into the left kidney..  Over the wire a 6 fr x 26 cm double-J stent was placed.  There was a good proximal curl confirmed fluoroscopically and distal curl visually.  The bladder was emptied and the scopes were removed concluding the procedure.    Counts were correct. Sign-out was performed with the surgical team confirming the specimen listed below. The patient tolerated  the procedure well, emerged from anesthesia without incident, and was transferred to PACU in stable condition.         Alli Wong  Phone Number: [phone]

## 2025-06-25 NOTE — PROGRESS NOTES
Medical Group Progress Note  ASSESSMENT & PLAN:     Obstructive Ureterolithiasis on Left  Moderate Left Hydronephrosis  - Urinalysis pending, await findings determine if antibiotic coverage is needed  - Continue hydration with 0.9% normal saline at 125 mL/h overnight  -Start tamsulosin, first dose now  -Strain all urine  -Formally consult urology for evaluation and keep n.p.o. after midnight  - Continue pain control with hydromorphone and nausea control with ondansetron  -Continuous pulse oximetry while on IV narcotics     Prediabetes  - Suggested by most recent hemoglobin A1c  - No indication for sliding scale insulin currently but monitor sugars     Acute Kidney Injury on Chronic Kidney Disease  - Patient has baseline creatinine around 1.05 corresponding to stage II chronic kidney disease.  Creatinine on presentation was mildly elevated at 1.33.  -Likely related to hydronephrosis and acute response.  -Hydrate as noted above and address kidney stone as noted  -Check urine protein to creatinine ratio, urinalysis, and urine electrolytes  - Continue to trend periprocedure renal function     Hyperlipidemia  - Continue statin once medications reconciled     Hypothyroidism  - Continue home levothyroxine and liothyronine once medications reconciled     Depression/Anxiety  - Continue home sertraline and doxepin once medications reconciled        VTE Prophylaxis: held for procedure    6/25/25:  Pain controlled today  Cipro added for possible UTI based on UA  Follow urine culture  Plan cystoscopy and stent placement today  Follow renal function, up a little bit today  Cont IVF and flomax  Strain urine      Andrew You MD    SUBJECTIVE     No overnight events. Pain controlled. No fever. No n/v. No diarrhea.     OBJECTIVE:     Last Recorded Vitals:  Vitals:    06/24/25 2050 06/25/25 0032 06/25/25 0605 06/25/25 0741   BP: 133/70 124/64  113/66   BP Location: Left arm Left arm  Right arm   Patient Position: Sitting  "Lying  Lying   Pulse: 80 64  87   Resp: 17 18  18   Temp: 36.7 °C (98.1 °F) 36.6 °C (97.9 °F)  36 °C (96.8 °F)   TempSrc: Temporal Temporal  Temporal   SpO2: 94% 98%  96%   Weight: 58.7 kg (129 lb 4.8 oz)  58.9 kg (129 lb 14.4 oz)    Height: 1.727 m (5' 8\")  1.727 m (5' 8\")      Last I/O:  I/O last 3 completed shifts:  In: 960 (16.3 mL/kg) [P.O.:960]  Out: - (0 mL/kg)   Weight: 58.9 kg     Physical Exam  GEN: healthy appearing, appears stated age, NAD  HEENT: NCAT, PERRLA, EOMI, moist mucous membranes  NECK: supple, no masses  CV: RRR, no m/r/g, no LE edema  LUNGS: CTAB, no w/r/c  ABD: soft, NT, ND, NBS  SKIN: no rashes  MSK; no gross deformities, normal joints  NEURO: A+Ox3, no FND  PSYCH: appropriate mood, affect      Inpatient Medications:  Scheduled Medications[1]    PRN Medications  PRN Medications[2]    Continuous Medications:  Continuous Medications[3]  LABS AND IMAGING:     Labs:  Results from last 7 days   Lab Units 06/25/25  0540 06/24/25  1706   WBC AUTO x10*3/uL 5.3 5.3   RBC AUTO x10*6/uL 4.02 4.22   HEMOGLOBIN g/dL 12.2 12.9   HEMATOCRIT % 37.7 38.6   MCV fL 94 92   MCH pg 30.3 30.6   MCHC g/dL 32.4 33.4   RDW % 13.3 13.3   PLATELETS AUTO x10*3/uL 174 206     Results from last 7 days   Lab Units 06/25/25  0540 06/24/25  1706   SODIUM mmol/L 137 138   POTASSIUM mmol/L 4.0 4.0   CHLORIDE mmol/L 107 105   CO2 mmol/L 23 22   BUN mg/dL 20 21   CREATININE mg/dL 1.56* 1.33*   GLUCOSE mg/dL 96 110*   PROTEIN TOTAL g/dL 5.8* 7.0   CALCIUM mg/dL 8.6 9.4   BILIRUBIN TOTAL mg/dL 0.6 0.8   ALK PHOS U/L 56 64   AST U/L 21 30   ALT U/L 12 17     Results from last 7 days   Lab Units 06/25/25  0540   MAGNESIUM mg/dL 1.78     Results from last 7 days   Lab Units 06/24/25  1706   TROPHS ng/L 3     Imaging:  CT abdomen pelvis w IV contrast  Narrative: Interpreted By:  Nabil Pride,   STUDY:  CT ABDOMEN PELVIS W IV CONTRAST;  6/24/2025 5:56 pm      INDICATION:  Signs/Symptoms:pain.          COMPARISON:  CT abdomen and " pelvis 08/13/2014      ACCESSION NUMBER(S):  [id number]      ORDERING CLINICIAN:  ISSA EARLY      TECHNIQUE:  Contiguous axial images of the abdomen and pelvis were obtained after  the intravenous administration of 75 mL Omnipaque 350 contrast.  Coronal and sagittal reformatted images were reconstructed from the  axial data.      FINDINGS:  LOWER CHEST: No acute abnormality.      LIVER: Multiple hepatic cysts and additional subcentimeter  hypodensities too small to fully characterize.      BILE DUCTS: No significant intrahepatic or extrahepatic dilatation.      GALLBLADDER: No significant abnormality.      PANCREAS: No significant abnormality.      SPLEEN: No significant abnormality.      ADRENALS: No significant abnormality.      KIDNEYS, URETERS, BLADDER: 4.7 cm left renal cyst. Additional  bilateral renal hypodensities, too small to fully characterize.  Moderate left hydroureteronephrosis to the level of the 6 mm calculus  in the proximal left ureter (series 203, image 46). Scattered  additional nonobstructing left renal calculi on the order of 2-3 mm.  No gross bladder abnormality although assessment limited by streak  artifact from bilateral hip prostheses.      REPRODUCTIVE ORGANS: Limited assessment due to streak artifact.      GI: Status post Maria Victoria-en-Y gastric bypass. No bowel obstruction. No  appreciable bowel inflammation. The appendix is not discretely  visualized. Moderate colonic stool burden.      VESSELS: No significant abnormality. The portal veins and IVC are  patent.      PERITONEUM/RETROPERITONEUM: No ascites, free air, or fluid collection.      LYMPH NODES: No enlarged lymph nodes.      ABDOMINAL WALL: No significant abnormality.      OSSEOUS STRUCTURES: No acute osseous abnormality. Bilateral total hip  arthroplasties.      Impression: 1. 6 mm calculus in the proximal left ureter with moderate left  hydronephrosis.      MACRO:  None      Signed by: Nabil Pride 6/24/2025 6:17 PM  Dictation  workstation:   [id number]  ECG 12 lead  Normal sinus rhythm  Normal ECG  When compared with ECG of 16-FEB-2020 18:38,  No significant change was found    See ED provider note for full interpretation and clinical correlation              [1] atorvastatin, 80 mg, oral, Nightly  ciprofloxacin, 400 mg, intravenous, q12h  dicyclomine, 20 mg, oral, TID  doxepin, 20 mg, oral, Nightly  levothyroxine, 112 mcg, oral, Daily  liothyronine, 10 mcg, oral, Daily  metroNIDAZOLE, 250 mg, oral, TID  pantoprazole, 40 mg, oral, BID  sertraline, 100 mg, oral, Daily  sucralfate, 1 g, oral, TID  tamsulosin, 0.4 mg, oral, Daily  [2] PRN medications: acetaminophen **OR** acetaminophen **OR** acetaminophen, HYDROmorphone, hydrOXYzine HCL, melatonin, ondansetron ODT **OR** ondansetron, polyethylene glycol  [3] sodium chloride 0.9%, 125 mL/hr, Last Rate: 125 mL/hr (06/24/25 2284)

## 2025-06-25 NOTE — CARE PLAN
The patient's goals for the shift include to be able to get some rest    The clinical goals for the shift include Pain free

## 2025-06-25 NOTE — PROGRESS NOTES
06/25/25 0835   Discharge Planning   Living Arrangements Alone   Support Systems Spouse/significant other   Assistance Needed Independent   Type of Residence Private residence   Number of Stairs to Enter Residence 0   Number of Stairs Within Residence 0   Do you have animals or pets at home? Yes   Type of Animals or Pets 2 cats   Who is requesting discharge planning? Provider   Home or Post Acute Services None   Expected Discharge Disposition Home   Does the patient need discharge transport arranged? No   Patient Choice   Provider Choice list and CMS website (https://medicare.gov/care-compare#search) for post-acute Quality and Resource Measure Data were provided and reviewed with: Patient   Patient / Family choosing to utilize agency / facility established prior to hospitalization No   Stroke Family Assessment   Stroke Family Assessment Needed No   Intensity of Service   Intensity of Service 0-30 min     Patient admitted to hospital from home for c/o abdomina pain. Patient found on CT to have  1. 6 mm calculus in the proximal left ureter with moderate left  hydronephrosis. Patient awaiting to be seen by urology this morning to determine plan of care. Patient lives alone, is independent, still driving. Plan will be home, no needs identified at this time. CT team will continue to follow.

## 2025-06-25 NOTE — ANESTHESIA POSTPROCEDURE EVALUATION
Patient: Sosa Noland    Procedure Summary       Date: 06/25/25 Room / Location: [facility] / [facility]    Anesthesia Start: 1643 Anesthesia Stop:     Procedure: CYSTOSCOPY, WITH URETERAL STENT INSERTION (Left) Diagnosis:       Calculus of ureter      Acute kidney injury superimposed on chronic kidney disease      (Calculus of ureter [N20.1])      (Acute kidney injury superimposed on chronic kidney disease [N17.9, N18.9])    Surgeons: Alli Wong MD Responsible Provider: Emili Faye MD    Anesthesia Type: general ASA Status: 2 - Emergent            Anesthesia Type: general    Vitals Value Taken Time   /56 06/25/25 17:12   Temp - 06/25/25 17:12   Pulse 72 06/25/25 17:12   Resp 12 06/25/25 17:12   SpO2 98 06/25/25 17:12       Anesthesia Post Evaluation    Patient location during evaluation: PACU  Patient participation: complete - patient participated  Level of consciousness: awake  Pain management: adequate  Multimodal analgesia pain management approach  Airway patency: patent  Cardiovascular status: acceptable  Respiratory status: acceptable  Hydration status: acceptable  Postoperative Nausea and Vomiting: none        No notable events documented.

## 2025-06-25 NOTE — CONSULTS
"Nutrition Initial Assessment:   Nutrition Assessment    Reason for Assessment: Dietitian discretion    Patient is a 69 y.o. female presenting with obstructive uropathy  Past medical history of kidney stones, chronic kidney disease, complex renal cyst, generalized anxiety disorder, GERD, gastroparesis osteoarthritis, hyperlipidemia, hypothyroidism, irritable bowel syndrome, lumbar degenerative disc disease, obstructive sleep apnea, osteopenia, prediabetes, and hypertension.       Nutrition History:  Energy Intake:  (NPO)  Pain affecting nutrition status: N/A  Food and Nutrient History: RDN self consult for BMI < 18.5. Bedscale wt of 129 lb puts pt BMI to above 18.5. Pt reluctant to speak with RD. Pt denies recent wt changes, states  lb. Pt reports appetite OK, states she is on multiple diet restrictions at home due to kidney disease and \"gut bacteria\" causing her to limit sugar intake. Pt denies GI symptoms at this time. Pt denies chewing or swallowing difficulty. Will monitor po intake and follow for the need for ONS.  Vitamin/Herbal Supplement Use: D3 per home med list       Anthropometrics:  Height: 172.7 cm (5' 8\")   Weight: 58.9 kg (129 lb 14.4 oz)   BMI (Calculated): 19.76  IBW/kg (Dietitian Calculated): 63.6 kg  Percent of IBW: 93 %                      Weight History:   Wt Readings from Last 10 Encounters:   06/25/25 58.9 kg (129 lb 14.4 oz)      Weight Change %:  Weight History / % Weight Change: no wt history available, current wt above stated UBW of 120 lb  Significant Weight Loss: No    Nutrition Focused Physical Exam Findings:    Subcutaneous Fat Loss:   Defer Subcutaneous Fat Loss Assessment: Defer all  Defer All Reason: pt declined  Muscle Wasting:  Defer Muscle Wasting Assessment: Defer all  Defer All Reason: pt declined  Edema:  Edema: none  Physical Findings:  Skin: Negative    Nutrition Significant Labs:  BMP Trend:   Results from last 7 days   Lab Units 06/25/25  0540 06/24/25  1706 "   GLUCOSE mg/dL 96 110*   CALCIUM mg/dL 8.6 9.4   SODIUM mmol/L 137 138   POTASSIUM mmol/L 4.0 4.0   CO2 mmol/L 23 22   CHLORIDE mmol/L 107 105   BUN mg/dL 20 21   CREATININE mg/dL 1.56* 1.33*    , A1C:  Lab Results   Component Value Date    HGBA1C 6.4 (H) 06/16/2025   , BG POCT trend:        Nutrition Specific Medications:  Scheduled medications  Scheduled Medications[1]  Continuous medications  Continuous Medications[2]  PRN medications  PRN Medications[3]     I/O:   Last BM Date: 06/23/25;      Dietary Orders (From admission, onward)       Start     Ordered    06/25/25 0001  NPO Diet Except: Other (specify); Additional Details: Clear liquids until 0500. May have clears up to 2 hours prior to procedure if exact time is known.; Effective midnight  Diet effective midnight        Question Answer Comment   Except: Other (specify)    Additional Details Clear liquids until 0500. May have clears up to 2 hours prior to procedure if exact time is known.        06/24/25 1855    06/24/25 2122  May Participate in Room Service  ( ROOM SERVICE MAY PARTICIPATE)  Once        Question:  .  Answer:  Yes    06/24/25 2121                     Estimated Needs:   Total Energy Estimated Needs in 24 hours (kCal): 1473 kCal  Method for Estimating Needs: 25 kcal/kg ABW  Total Protein Estimated Needs in 24 Hours (g): 59 g  Method for Estimating 24 Hour Protein Needs: 1 g/kg ABW  Total Fluid Estimated Needs in 24 Hours (mL): 1473 mL  Method for Estimating 24 Hour Fluid Needs: 1 ml/kcal or per MD  Patient on Order Fluid Restriction: No        Nutrition Diagnosis   Malnutrition Diagnosis  Patient has Malnutrition Diagnosis: No    Nutrition Diagnosis  Patient has Nutrition Diagnosis: No  Nutrition Diagnosis 1: No nutrition diagnosis at this time       Nutrition Interventions/Recommendations   Nutrition prescription for oral nutrition    Nutrition Recommendations:  Individualized Nutrition Prescription Provided for : Advance to Low Sodium diet  when medically appropriate, monitor need for ONS    Nutrition Interventions/Goals:   Meals and Snacks: Mineral-modified diet  Goal: Consumes 3 meals per day      Education Documentation  No documentation found.    No needs at this time        Nutrition Monitoring and Evaluation   Estimated Energy Intake: Energy intake 50 -75% of estimated energy needs  Intake / Amount of food: Consumes at least 50% or more of meals/snacks/supplements    Body Weight: Body weight - Maintain stable weight    Electrolyte and Renal Panel: Electrolytes within normal limits  Glucose/Endocrine Profile: Glucose within normal limits ( mg/dL)              Time Spent (min): 40 minutes            [1] atorvastatin, 80 mg, oral, Nightly  ciprofloxacin, 400 mg, intravenous, q12h  dicyclomine, 20 mg, oral, TID  doxepin, 20 mg, oral, Nightly  levothyroxine, 112 mcg, oral, Daily  liothyronine, 10 mcg, oral, Daily  metroNIDAZOLE, 250 mg, oral, TID  pantoprazole, 40 mg, oral, BID  sertraline, 100 mg, oral, Daily  sucralfate, 1 g, oral, TID  tamsulosin, 0.4 mg, oral, Daily  [2] sodium chloride 0.9%, 125 mL/hr, Last Rate: 125 mL/hr (06/24/25 2224)  [3] PRN medications: acetaminophen **OR** acetaminophen **OR** acetaminophen, HYDROmorphone, hydrOXYzine HCL, melatonin, ondansetron ODT **OR** ondansetron, polyethylene glycol

## 2025-06-25 NOTE — ANESTHESIA PREPROCEDURE EVALUATION
Patient: Sosa Noland    Procedure Information       Date/Time: 06/25/25 1600    Procedure: CYSTOSCOPY, WITH URETERAL STENT INSERTION (Left)    Location: [facility]    Surgeons: Alli Wong MD            Relevant Problems   Cardiac   (+) Hyperlipidemia   (+) Primary hypertension      /Renal   (+) Hydronephrosis of left kidney   (+) Nephrolithiasis      Endocrine   (+) Hypothyroidism      Genitourinary   (+) Acute kidney injury superimposed on chronic kidney disease   (+) Calculus of ureter   (+) Obstructive uropathy       Clinical information reviewed:   Tobacco  Allergies  Meds   Med Hx  Surg Hx   Fam Hx          NPO Detail:  No data recorded     Physical Exam    Airway  Mallampati: II  TM distance: >3 FB  Neck ROM: full  Mouth opening: 3 or more finger widths     Cardiovascular   Rhythm: regular  Rate: normal     Dental    Pulmonary - normal exam   Abdominal - normal exam           Anesthesia Plan    History of general anesthesia?: yes  History of complications of general anesthesia?: no    ASA 2 - emergent     general   (LMA)  The patient is not a current smoker.    intravenous induction   Postoperative pain plan includes opioids.  Anesthetic plan and risks discussed with patient.    Plan discussed with CRNA.

## 2025-06-25 NOTE — CARE PLAN
The patient's goals for the shift include to be able to get some rest    The clinical goals for the shift include to treat pain accordingly    Over the shift, the patient did make progress toward the plan of care goals.

## 2025-06-25 NOTE — CONSULTS
Inpatient consult to Urology  Consult performed by: Odessa Sung PA-C  Consult ordered by: Roni Tubbs MD  Reason for consult: Obstructing ureteral stone      History Of Present Illness  Sosa Noland is a 69 y.o. female with a PMH of kidney stones, CKD, complex renal cyst, generalized anxiety disorder, GERD, gastroparesis, osteoarthritis, HLD, hypothyroidism, IBS, lumbar degenerative disc disease, MEE, osteopenia, prediabetes, and HTN who [place] [facility] [place] sudden lower abdominal pain.  CT ap revealed a 6mm proximal left ureteral stone with associated moderate hydronephrosis.  Labs significant for ALEJANDRO on CKD.  No leukocytosis.  UA micro positive for RBC, WBC, +1 bacteria.  She was admitted for further mngt.    This morning patient rates her pain a 8/10.  Denies flank pain but reports pain all over the abdomen.  Denies dysuria, hematuria, urinary frequency, difficulties urinating, vomiting, fever, chills.  She passed a kidney stone on her own 9 years ago but it was smaller.  Her Nephrologist has been monitoring her kidney stone and a complex renal cyst.  Today slight rise in CR at 1.56.     Past Medical History  Medical History[1]     Surgical History  Surgical History[2]      Social History  Social Drivers of Health     Tobacco Use: Low Risk  (6/24/2025)    Patient History     Smoking Tobacco Use: Never     Smokeless Tobacco Use: Never     Passive Exposure: Not on file   Alcohol Use: Alcohol Misuse (6/24/2025)    AUDIT-C     Frequency of Alcohol Consumption: 2-4 times a month     Average Number of Drinks: 3 or 4     Frequency of Binge Drinking: Less than monthly   Financial Resource Strain: Low Risk  (6/24/2025)    Overall Financial Resource Strain (CARDIA)     Difficulty of Paying Living Expenses: Not hard at all   Food Insecurity: No Food Insecurity (6/24/2025)    Hunger Vital Sign     Worried About Running Out of Food in the Last Year: Never true     Ran Out of Food in the Last Year:  Never true   Transportation Needs: No Transportation Needs (6/24/2025)    PRAPARE - Transportation     Lack of Transportation (Medical): No     Lack of Transportation (Non-Medical): No   Physical Activity: Unknown (6/24/2025)    Exercise Vital Sign     Days of Exercise per Week: 3 days     Minutes of Exercise per Session: Not on file   Stress: Stress Concern Present (6/30/2020)    Received from [facility]    [employer] - Occupational Stress Questionnaire     Feeling of Stress : To some extent   Social Connections: Unknown (6/30/2020)    Received from [facility]    Social Connection and Isolation Panel [NHANES]     Frequency of Communication with Friends and Family: Not on file     Frequency of Social Gatherings with Friends and Family: Not on file     Attends Latter day Services: 1 to 4 times per year     Active Member of Clubs or Organizations: No     Attends Club or Organization Meetings: Patient declined     Marital Status:    Intimate Partner Violence: Not At Risk (6/24/2025)    Humiliation, Afraid, Rape, and Kick questionnaire     Fear of Current or Ex-Partner: No     Emotionally Abused: No     Physically Abused: No     Sexually Abused: No   Depression: Not at risk (6/24/2025)    PHQ-2     PHQ-2 Score: 0   Housing Stability: Low Risk  (6/24/2025)    Housing Stability Vital Sign     Unable to Pay for Housing in the Last Year: No     Number of Times Moved in the Last Year: 0     Homeless in the Last Year: No   Utilities: Not At Risk (6/24/2025)    [facility] Utilities     Threatened with loss of utilities: No   Digital Equity: Not on file   Health Literacy: Not on file      Family History  Family History[3]     Home Medications  Prior to Admission medications    Medication Sig Start Date End Date Taking? Authorizing Provider   metroNIDAZOLE (Flagyl) 250 mg tablet Take 1 tablet (250 mg) by mouth 3 times a day. For 10 days. Prescribed 6/19/25.   Yes Historical Provider, MD    atorvastatin (Lipitor) 80 mg tablet Take 1 tablet (80 mg) by mouth once daily at bedtime.    Historical Provider, MD   cholecalciferol (Vitamin D3) 25 mcg (1,000 units) tablet Take 2 tablets (50 mcg) by mouth once daily.    Historical Provider, MD   dicyclomine (Bentyl) 20 mg tablet Take 1 tablet (20 mg) by mouth 3 times a day.    Historical Provider, MD   doxepin (SINEquan) 10 mg capsule Take 2 capsules (20 mg) by mouth once daily at bedtime.    Historical Provider, MD   hydrocortisone 2.5 % ointment Apply 1 Application topically 2 times a day as needed (to face as needed for itching).    Historical Provider, MD   hydrOXYzine HCL (Atarax) 10 mg tablet Take 1 tablet (10 mg) by mouth 2 times a day as needed for itching.    Historical Provider, MD   hyoscyamine (Anaspaz) 0.125 mg disintegrating tablet Place 1 tablet (0.125 mg) under the tongue every 6 hours if needed.    Historical Provider, MD   levothyroxine (Synthroid) 112 mcg tablet Take 1 tablet (112 mcg) by mouth early in the morning.. Take on an empty stomach at the same time each day, either 30 to 60 minutes prior to breakfast    Historical Provider, MD   liothyronine (Cytomel) 5 mcg tablet Take 2 tablets (10 mcg) by mouth once daily.    Historical Provider, MD   pantoprazole (ProtoNix) 40 mg EC tablet Take 1 tablet (40 mg) by mouth 2 times a day. Do not crush, chew, or split.    Historical Provider, MD   sertraline (Zoloft) 100 mg tablet Take 1 tablet (100 mg) by mouth once daily.    Historical Provider, MD   sucralfate (Carafate) 1 gram tablet Take 1 tablet (1 g) by mouth 3 times a day.    Historical Provider, MD   hydrocortisone 2.5 % lotion Apply topically 2 times a day.  6/24/25  Historical Provider, MD      Allergies  Ultracef    Review of Systems  Review of Systems   Constitutional:  Negative for chills and fever.   Gastrointestinal:  Positive for abdominal pain. Negative for vomiting.   Genitourinary:  Negative for difficulty urinating, dysuria,  "flank pain, frequency and hematuria.     Physical Exam  Physical Exam  Vitals and nursing note reviewed.   Constitutional:       General: She is awake.      Appearance: Normal appearance. She is not ill-appearing.   Pulmonary:      Effort: Pulmonary effort is normal. No respiratory distress.   Abdominal:      General: Abdomen is flat. There is no distension.      Palpations: Abdomen is soft.      Tenderness: There is no abdominal tenderness. There is no right CVA tenderness or left CVA tenderness.   Neurological:      Mental Status: She is alert and oriented to person, place, and time.   Psychiatric:         Behavior: Behavior is cooperative.     Medications  Scheduled medications  Scheduled Medications[4]  Continuous medications  Continuous Medications[5]  PRN medications  PRN Medications[6]     Last Recorded Vitals  Heart Rate:  [64-87]   Temp:  [36 °C (96.8 °F)-36.7 °C (98.1 °F)]   Resp:  [17-20]   BP: (113-144)/(64-70)   Height:  [172.7 cm (5' 8\")]   Weight:  [54.4 kg (120 lb)-58.9 kg (129 lb 14.4 oz)]   SpO2:  [94 %-98 %]      Input/Output    Intake/Output Summary (Last 24 hours) at 6/25/2025 0907  Last data filed at 6/25/2025 0605  Gross per 24 hour   Intake 960 ml   Output --   Net 960 ml      Labs  Results for orders placed or performed during the hospital encounter of 06/24/25 (from the past 24 hours)   CBC and Auto Differential   Result Value Ref Range    WBC 5.3 4.4 - 11.3 x10*3/uL    nRBC 0.0 0.0 - 0.0 /100 WBCs    RBC 4.22 4.00 - 5.20 x10*6/uL    Hemoglobin 12.9 12.0 - 16.0 g/dL    Hematocrit 38.6 36.0 - 46.0 %    MCV 92 80 - 100 fL    MCH 30.6 26.0 - 34.0 pg    MCHC 33.4 32.0 - 36.0 g/dL    RDW 13.3 11.5 - 14.5 %    Platelets 206 150 - 450 x10*3/uL    Neutrophils % 60.4 40.0 - 80.0 %    Immature Granulocytes %, Automated 0.4 0.0 - 0.9 %    Lymphocytes % 29.4 13.0 - 44.0 %    Monocytes % 7.9 2.0 - 10.0 %    Eosinophils % 1.5 0.0 - 6.0 %    Basophils % 0.4 0.0 - 2.0 %    Neutrophils Absolute 3.23 1.60 - " 5.50 x10*3/uL    Immature Granulocytes Absolute, Automated 0.02 0.00 - 0.50 x10*3/uL    Lymphocytes Absolute 1.57 0.80 - 3.00 x10*3/uL    Monocytes Absolute 0.42 0.05 - 0.80 x10*3/uL    Eosinophils Absolute 0.08 0.00 - 0.40 x10*3/uL    Basophils Absolute 0.02 0.00 - 0.10 x10*3/uL   Comprehensive metabolic panel   Result Value Ref Range    Glucose 110 (H) 74 - 99 mg/dL    Sodium 138 136 - 145 mmol/L    Potassium 4.0 3.5 - 5.3 mmol/L    Chloride 105 98 - 107 mmol/L    Bicarbonate 22 21 - 32 mmol/L    Anion Gap 15 10 - 20 mmol/L    Urea Nitrogen 21 6 - 23 mg/dL    Creatinine 1.33 (H) 0.50 - 1.05 mg/dL    eGFR 43 (L) >60 mL/min/1.73m*2    Calcium 9.4 8.6 - 10.3 mg/dL    Albumin 4.7 3.4 - 5.0 g/dL    Alkaline Phosphatase 64 33 - 136 U/L    Total Protein 7.0 6.4 - 8.2 g/dL    AST 30 9 - 39 U/L    Bilirubin, Total 0.8 0.0 - 1.2 mg/dL    ALT 17 7 - 45 U/L   Lipase   Result Value Ref Range    Lipase 22 9 - 82 U/L   Lactate   Result Value Ref Range    Lactate 1.2 0.4 - 2.0 mmol/L   Troponin I, High Sensitivity   Result Value Ref Range    Troponin I, High Sensitivity 3 0 - 13 ng/L   ECG 12 lead   Result Value Ref Range    Ventricular Rate 76 BPM    Atrial Rate 76 BPM    WY Interval 144 ms    QRS Duration 78 ms    QT Interval 412 ms    QTC Calculation(Bazett) 463 ms    P Axis -21 degrees    R Axis 74 degrees    T Axis 70 degrees    QRS Count 12 beats    Q Onset 221 ms    P Onset 149 ms    P Offset 177 ms    T Offset 427 ms    QTC Fredericia 445 ms   Urinalysis with Reflex Culture and Microscopic   Result Value Ref Range    Color, Urine Light-Yellow Light-Yellow, Yellow, Dark-Yellow    Appearance, Urine Clear Clear    Specific Gravity, Urine 1.032 1.005 - 1.035    pH, Urine 5.5 5.0, 5.5, 6.0, 6.5, 7.0, 7.5, 8.0    Protein, Urine NEGATIVE NEGATIVE, 10 (TRACE), 20 (TRACE) mg/dL    Glucose, Urine Normal Normal mg/dL    Blood, Urine OVER (3+) (A) NEGATIVE mg/dL    Ketones, Urine NEGATIVE NEGATIVE mg/dL    Bilirubin, Urine NEGATIVE  NEGATIVE mg/dL    Urobilinogen, Urine Normal Normal mg/dL    Nitrite, Urine NEGATIVE NEGATIVE    Leukocyte Esterase, Urine NEGATIVE NEGATIVE   Protein, Urine Random   Result Value Ref Range    Total Protein, Urine Random 15 5 - 24 mg/dL    Creatinine, Urine Random 69.8 20.0 - 320.0 mg/dL    T. Protein/Creatinine Ratio 0.21 (H) 0.00 - 0.17 mg/mg Creat   Urine electrolytes   Result Value Ref Range    Sodium, Urine Random 116 mmol/L    Sodium/Creatinine Ratio 166 Not established. mmol/g Creat    Potassium, Urine Random 30 mmol/L    Potassium/Creatinine Ratio 43 Not established mmol/g Creat    Chloride, Urine Random 127 mmol/L    Chloride/Creatinine Ratio 182 38 - 318 mmol/g creat    Creatinine, Urine Random 69.8 20.0 - 320.0 mg/dL   Urinalysis Microscopic   Result Value Ref Range    WBC, Urine 21-50 (A) 1-5, NONE /HPF    RBC, Urine >20 (A) NONE, 1-2, 3-5 /HPF    Bacteria, Urine 1+ (A) NONE SEEN /HPF    Mucus, Urine FEW Reference range not established. /LPF   Comprehensive metabolic panel   Result Value Ref Range    Glucose 96 74 - 99 mg/dL    Sodium 137 136 - 145 mmol/L    Potassium 4.0 3.5 - 5.3 mmol/L    Chloride 107 98 - 107 mmol/L    Bicarbonate 23 21 - 32 mmol/L    Anion Gap 11 10 - 20 mmol/L    Urea Nitrogen 20 6 - 23 mg/dL    Creatinine 1.56 (H) 0.50 - 1.05 mg/dL    eGFR 35 (L) >60 mL/min/1.73m*2    Calcium 8.6 8.6 - 10.3 mg/dL    Albumin 3.8 3.4 - 5.0 g/dL    Alkaline Phosphatase 56 33 - 136 U/L    Total Protein 5.8 (L) 6.4 - 8.2 g/dL    AST 21 9 - 39 U/L    Bilirubin, Total 0.6 0.0 - 1.2 mg/dL    ALT 12 7 - 45 U/L   CBC and Auto Differential   Result Value Ref Range    WBC 5.3 4.4 - 11.3 x10*3/uL    nRBC 0.0 0.0 - 0.0 /100 WBCs    RBC 4.02 4.00 - 5.20 x10*6/uL    Hemoglobin 12.2 12.0 - 16.0 g/dL    Hematocrit 37.7 36.0 - 46.0 %    MCV 94 80 - 100 fL    MCH 30.3 26.0 - 34.0 pg    MCHC 32.4 32.0 - 36.0 g/dL    RDW 13.3 11.5 - 14.5 %    Platelets 174 150 - 450 x10*3/uL    Neutrophils % 65.4 40.0 - 80.0 %     Immature Granulocytes %, Automated 0.4 0.0 - 0.9 %    Lymphocytes % 23.0 13.0 - 44.0 %    Monocytes % 9.1 2.0 - 10.0 %    Eosinophils % 1.7 0.0 - 6.0 %    Basophils % 0.4 0.0 - 2.0 %    Neutrophils Absolute 3.43 1.60 - 5.50 x10*3/uL    Immature Granulocytes Absolute, Automated 0.02 0.00 - 0.50 x10*3/uL    Lymphocytes Absolute 1.21 0.80 - 3.00 x10*3/uL    Monocytes Absolute 0.48 0.05 - 0.80 x10*3/uL    Eosinophils Absolute 0.09 0.00 - 0.40 x10*3/uL    Basophils Absolute 0.02 0.00 - 0.10 x10*3/uL   Magnesium   Result Value Ref Range    Magnesium 1.78 1.60 - 2.40 mg/dL   SST TOP   Result Value Ref Range    Extra Tube Hold for add-ons.        Imaging  CT abdomen pelvis w IV contrast  Result Date: 6/24/2025  Interpreted By:  Nabil Pride, STUDY: CT ABDOMEN PELVIS W IV CONTRAST;  6/24/2025 5:56 pm   INDICATION: Signs/Symptoms:pain.     COMPARISON: CT abdomen and pelvis 08/13/2014   ACCESSION NUMBER(S): [id number]   ORDERING CLINICIAN: ISSA EARLY   TECHNIQUE: Contiguous axial images of the abdomen and pelvis were obtained after the intravenous administration of 75 mL Omnipaque 350 contrast. Coronal and sagittal reformatted images were reconstructed from the axial data.   FINDINGS: LOWER CHEST: No acute abnormality.   LIVER: Multiple hepatic cysts and additional subcentimeter hypodensities too small to fully characterize.   BILE DUCTS: No significant intrahepatic or extrahepatic dilatation.   GALLBLADDER: No significant abnormality.   PANCREAS: No significant abnormality.   SPLEEN: No significant abnormality.   ADRENALS: No significant abnormality.   KIDNEYS, URETERS, BLADDER: 4.7 cm left renal cyst. Additional bilateral renal hypodensities, too small to fully characterize. Moderate left hydroureteronephrosis to the level of the 6 mm calculus in the proximal left ureter (series 203, image 46). Scattered additional nonobstructing left renal calculi on the order of 2-3 mm. No gross bladder abnormality although assessment  limited by streak artifact from bilateral hip prostheses.   REPRODUCTIVE ORGANS: Limited assessment due to streak artifact.   GI: Status post Maria Victoria-en-Y gastric bypass. No bowel obstruction. No appreciable bowel inflammation. The appendix is not discretely visualized. Moderate colonic stool burden.   VESSELS: No significant abnormality. The portal veins and IVC are patent.   PERITONEUM/RETROPERITONEUM: No ascites, free air, or fluid collection.   LYMPH NODES: No enlarged lymph nodes.   ABDOMINAL WALL: No significant abnormality.   OSSEOUS STRUCTURES: No acute osseous abnormality. Bilateral total hip arthroplasties.       1. 6 mm calculus in the proximal left ureter with moderate left hydronephrosis.   MACRO: None   Signed by: Nabil Pride 6/24/2025 6:17 PM Dictation workstation:   [id number]    ECG 12 lead  Result Date: 6/24/2025  Normal sinus rhythm Normal ECG When compared with ECG of 16-FEB-2020 18:38, No significant change was found See ED provider note for full interpretation and clinical correlation    Plan  1. Obstructing left ureteral stone with ALEJANDRO and possible UTI  -Keep NPO.  -Plan for cystoscopy with left ureteral stent insertion today.  -Discussed with patient that definitive stone tx to be done as an outpatient.  -Stent sxs include: flank pain, hematuria, dysuria, bladder spasms.  -Await urine culture, continue Cipro pending sensitivities.  -Continue Flomax.  -IVF and pain control per Medicine.    Plan of care discussed with Dr. Alli Wong MD.    Odessa Sung PA-C       [1]   Past Medical History:  Diagnosis Date    Other conditions influencing health status 12/01/2015    Cardiac arrest during or resulting from a procedure   [2]   Past Surgical History:  Procedure Laterality Date    APPENDECTOMY  03/25/2014    Appendectomy    COLONOSCOPY  03/25/2014    Complete Colonoscopy    FOOT SURGERY  12/01/2015    Foot Surgery Right    TOTAL HIP ARTHROPLASTY  12/01/2015    Total Hip Replacement   [3] No  family history on file.  [4] atorvastatin, 80 mg, oral, Nightly  dicyclomine, 20 mg, oral, TID  doxepin, 20 mg, oral, Nightly  levothyroxine, 112 mcg, oral, Daily  liothyronine, 10 mcg, oral, Daily  metroNIDAZOLE, 250 mg, oral, TID  pantoprazole, 40 mg, oral, BID  sertraline, 100 mg, oral, Daily  sucralfate, 1 g, oral, TID  tamsulosin, 0.4 mg, oral, Daily  [5] sodium chloride 0.9%, 125 mL/hr, Last Rate: 125 mL/hr (06/24/25 2664)  [6] PRN medications: acetaminophen **OR** acetaminophen **OR** acetaminophen, HYDROmorphone, hydrOXYzine HCL, melatonin, ondansetron ODT **OR** ondansetron, polyethylene glycol

## 2025-06-26 VITALS
OXYGEN SATURATION: 99 % | RESPIRATION RATE: 16 BRPM | WEIGHT: 142.64 LBS | SYSTOLIC BLOOD PRESSURE: 105 MMHG | HEIGHT: 68 IN | BODY MASS INDEX: 21.62 KG/M2 | TEMPERATURE: 97.5 F | HEART RATE: 62 BPM | DIASTOLIC BLOOD PRESSURE: 56 MMHG

## 2025-06-26 LAB
ALBUMIN SERPL BCP-MCNC: 3.4 G/DL (ref 3.4–5)
ALP SERPL-CCNC: 53 U/L (ref 33–136)
ALT SERPL W P-5'-P-CCNC: 9 U/L (ref 7–45)
ANION GAP SERPL CALC-SCNC: 9 MMOL/L (ref 10–20)
AST SERPL W P-5'-P-CCNC: 16 U/L (ref 9–39)
BACTERIA UR CULT: NORMAL
BASOPHILS # BLD AUTO: 0.02 X10*3/UL (ref 0–0.1)
BASOPHILS NFR BLD AUTO: 0.5 %
BILIRUB SERPL-MCNC: 0.4 MG/DL (ref 0–1.2)
BUN SERPL-MCNC: 16 MG/DL (ref 6–23)
CALCIUM SERPL-MCNC: 8.4 MG/DL (ref 8.6–10.3)
CHLORIDE SERPL-SCNC: 111 MMOL/L (ref 98–107)
CO2 SERPL-SCNC: 24 MMOL/L (ref 21–32)
CREAT SERPL-MCNC: 1.18 MG/DL (ref 0.5–1.05)
EGFRCR SERPLBLD CKD-EPI 2021: 49 ML/MIN/1.73M*2
EOSINOPHIL # BLD AUTO: 0.13 X10*3/UL (ref 0–0.4)
EOSINOPHIL NFR BLD AUTO: 3 %
ERYTHROCYTE [DISTWIDTH] IN BLOOD BY AUTOMATED COUNT: 13.2 % (ref 11.5–14.5)
GLUCOSE SERPL-MCNC: 115 MG/DL (ref 74–99)
HCT VFR BLD AUTO: 33.9 % (ref 36–46)
HGB BLD-MCNC: 11 G/DL (ref 12–16)
IMM GRANULOCYTES # BLD AUTO: 0.02 X10*3/UL (ref 0–0.5)
IMM GRANULOCYTES NFR BLD AUTO: 0.5 % (ref 0–0.9)
LYMPHOCYTES # BLD AUTO: 1.08 X10*3/UL (ref 0.8–3)
LYMPHOCYTES NFR BLD AUTO: 24.7 %
MAGNESIUM SERPL-MCNC: 1.68 MG/DL (ref 1.6–2.4)
MCH RBC QN AUTO: 30.6 PG (ref 26–34)
MCHC RBC AUTO-ENTMCNC: 32.4 G/DL (ref 32–36)
MCV RBC AUTO: 94 FL (ref 80–100)
MONOCYTES # BLD AUTO: 0.39 X10*3/UL (ref 0.05–0.8)
MONOCYTES NFR BLD AUTO: 8.9 %
NEUTROPHILS # BLD AUTO: 2.74 X10*3/UL (ref 1.6–5.5)
NEUTROPHILS NFR BLD AUTO: 62.4 %
NRBC BLD-RTO: 0 /100 WBCS (ref 0–0)
PLATELET # BLD AUTO: 163 X10*3/UL (ref 150–450)
POTASSIUM SERPL-SCNC: 4.1 MMOL/L (ref 3.5–5.3)
PROT SERPL-MCNC: 5.2 G/DL (ref 6.4–8.2)
RBC # BLD AUTO: 3.6 X10*6/UL (ref 4–5.2)
SODIUM SERPL-SCNC: 140 MMOL/L (ref 136–145)
WBC # BLD AUTO: 4.4 X10*3/UL (ref 4.4–11.3)

## 2025-06-26 PROCEDURE — 99238 HOSP IP/OBS DSCHRG MGMT 30/<: CPT | Performed by: INTERNAL MEDICINE

## 2025-06-26 PROCEDURE — 2500000004 HC RX 250 GENERAL PHARMACY W/ HCPCS (ALT 636 FOR OP/ED): Mod: JZ | Performed by: UROLOGY

## 2025-06-26 PROCEDURE — 2500000004 HC RX 250 GENERAL PHARMACY W/ HCPCS (ALT 636 FOR OP/ED): Performed by: UROLOGY

## 2025-06-26 PROCEDURE — 85025 COMPLETE CBC W/AUTO DIFF WBC: CPT | Performed by: UROLOGY

## 2025-06-26 PROCEDURE — G0378 HOSPITAL OBSERVATION PER HR: HCPCS

## 2025-06-26 PROCEDURE — 36415 COLL VENOUS BLD VENIPUNCTURE: CPT | Performed by: UROLOGY

## 2025-06-26 PROCEDURE — 2500000001 HC RX 250 WO HCPCS SELF ADMINISTERED DRUGS (ALT 637 FOR MEDICARE OP): Performed by: UROLOGY

## 2025-06-26 PROCEDURE — 83735 ASSAY OF MAGNESIUM: CPT | Performed by: UROLOGY

## 2025-06-26 PROCEDURE — 2500000002 HC RX 250 W HCPCS SELF ADMINISTERED DRUGS (ALT 637 FOR MEDICARE OP, ALT 636 FOR OP/ED): Performed by: UROLOGY

## 2025-06-26 PROCEDURE — 80053 COMPREHEN METABOLIC PANEL: CPT | Performed by: UROLOGY

## 2025-06-26 RX ORDER — PHENAZOPYRIDINE HYDROCHLORIDE 100 MG/1
100 TABLET, FILM COATED ORAL 3 TIMES DAILY PRN
Qty: 10 TABLET | Refills: 0 | Status: SHIPPED | OUTPATIENT
Start: 2025-06-26

## 2025-06-26 RX ORDER — OXYCODONE HYDROCHLORIDE 5 MG/1
5 TABLET ORAL EVERY 6 HOURS PRN
Qty: 8 TABLET | Refills: 0 | Status: SHIPPED | OUTPATIENT
Start: 2025-06-26

## 2025-06-26 RX ORDER — CIPROFLOXACIN 500 MG/1
500 TABLET, FILM COATED ORAL 2 TIMES DAILY
Qty: 10 TABLET | Refills: 0 | Status: SHIPPED | OUTPATIENT
Start: 2025-06-26 | End: 2025-07-01

## 2025-06-26 RX ORDER — TAMSULOSIN HYDROCHLORIDE 0.4 MG/1
0.4 CAPSULE ORAL NIGHTLY
Qty: 30 CAPSULE | Refills: 1 | Status: SHIPPED | OUTPATIENT
Start: 2025-06-26 | End: 2025-08-25

## 2025-06-26 RX ORDER — DICLOFENAC SODIUM 50 MG/1
50 TABLET, DELAYED RELEASE ORAL 2 TIMES DAILY PRN
Qty: 20 TABLET | Refills: 0 | Status: SHIPPED | OUTPATIENT
Start: 2025-06-26

## 2025-06-26 RX ADMIN — PANTOPRAZOLE SODIUM 40 MG: 40 TABLET, DELAYED RELEASE ORAL at 08:05

## 2025-06-26 RX ADMIN — LIOTHYRONINE SODIUM 10 MCG: 5 TABLET ORAL at 05:31

## 2025-06-26 RX ADMIN — LEVOTHYROXINE SODIUM 112 MCG: 112 TABLET ORAL at 05:31

## 2025-06-26 RX ADMIN — DICYCLOMINE HYDROCHLORIDE 20 MG: 10 CAPSULE ORAL at 08:05

## 2025-06-26 RX ADMIN — METRONIDAZOLE 250 MG: 250 TABLET ORAL at 08:04

## 2025-06-26 RX ADMIN — TAMSULOSIN HYDROCHLORIDE 0.4 MG: 0.4 CAPSULE ORAL at 08:05

## 2025-06-26 RX ADMIN — HYDROMORPHONE HYDROCHLORIDE 0.5 MG: 1 INJECTION, SOLUTION INTRAMUSCULAR; INTRAVENOUS; SUBCUTANEOUS at 13:22

## 2025-06-26 RX ADMIN — SERTRALINE HYDROCHLORIDE 100 MG: 50 TABLET, FILM COATED ORAL at 08:05

## 2025-06-26 ASSESSMENT — COGNITIVE AND FUNCTIONAL STATUS - GENERAL
DAILY ACTIVITIY SCORE: 24
MOBILITY SCORE: 24

## 2025-06-26 ASSESSMENT — PAIN SCALES - GENERAL: PAINLEVEL_OUTOF10: 0 - NO PAIN

## 2025-06-26 NOTE — CARE PLAN
The patient's goals for the shift include reduce pain and discomfort    The clinical goals for the shift include maintain fluid and electrolyte balance    O

## 2025-06-26 NOTE — PROGRESS NOTES
"Sosa Noland is a 69 y.o. female on day 2 of admission presenting with Obstructive uropathy.    Subjective   Follow-up obstructing left ureteral stone s/p cystoscopy with ureteral stent insertion yesterday.  Patient sleeping but easily woken, reports feeling improved today.  No longer having pain.  No dysuria or hematuria.  No urinary issues.  Denies fever/chills.  Creatinine improving (1.18).  Urine culture contaminated.     Objective     Physical Exam  Vitals and nursing note reviewed.   Constitutional:       General: She is awake. She is not in acute distress.     Appearance: Normal appearance. She is not ill-appearing.   Pulmonary:      Effort: Pulmonary effort is normal. No respiratory distress.   Abdominal:      General: Abdomen is flat.      Palpations: Abdomen is soft.      Tenderness: There is no abdominal tenderness.   Neurological:      Mental Status: She is alert and oriented to person, place, and time.   Psychiatric:         Behavior: Behavior normal. Behavior is cooperative.       Last Recorded Vitals  Blood pressure 105/56, pulse 62, temperature 36.4 °C (97.5 °F), temperature source Temporal, resp. rate 16, height 1.727 m (5' 8\"), weight 64.7 kg (142 lb 10.2 oz), SpO2 99%.  Intake/Output last 3 Shifts:  I/O last 3 completed shifts:  In: 1560 (24.1 mL/kg) [P.O.:960; I.V.:600 (9.3 mL/kg)]  Out: 400 (6.2 mL/kg) [Urine:400 (0.2 mL/kg/hr)]  Weight: 64.7 kg     Relevant Results  Scheduled medications  Scheduled Medications[1]  Continuous medications  Continuous Medications[2]  PRN medications  PRN Medications[3]    Results for orders placed or performed during the hospital encounter of 06/24/25 (from the past 24 hours)   Comprehensive metabolic panel   Result Value Ref Range    Glucose 115 (H) 74 - 99 mg/dL    Sodium 140 136 - 145 mmol/L    Potassium 4.1 3.5 - 5.3 mmol/L    Chloride 111 (H) 98 - 107 mmol/L    Bicarbonate 24 21 - 32 mmol/L    Anion Gap 9 (L) 10 - 20 mmol/L    Urea Nitrogen 16 6 - 23 mg/dL    " Creatinine 1.18 (H) 0.50 - 1.05 mg/dL    eGFR 49 (L) >60 mL/min/1.73m*2    Calcium 8.4 (L) 8.6 - 10.3 mg/dL    Albumin 3.4 3.4 - 5.0 g/dL    Alkaline Phosphatase 53 33 - 136 U/L    Total Protein 5.2 (L) 6.4 - 8.2 g/dL    AST 16 9 - 39 U/L    Bilirubin, Total 0.4 0.0 - 1.2 mg/dL    ALT 9 7 - 45 U/L   CBC and Auto Differential   Result Value Ref Range    WBC 4.4 4.4 - 11.3 x10*3/uL    nRBC 0.0 0.0 - 0.0 /100 WBCs    RBC 3.60 (L) 4.00 - 5.20 x10*6/uL    Hemoglobin 11.0 (L) 12.0 - 16.0 g/dL    Hematocrit 33.9 (L) 36.0 - 46.0 %    MCV 94 80 - 100 fL    MCH 30.6 26.0 - 34.0 pg    MCHC 32.4 32.0 - 36.0 g/dL    RDW 13.2 11.5 - 14.5 %    Platelets 163 150 - 450 x10*3/uL    Neutrophils % 62.4 40.0 - 80.0 %    Immature Granulocytes %, Automated 0.5 0.0 - 0.9 %    Lymphocytes % 24.7 13.0 - 44.0 %    Monocytes % 8.9 2.0 - 10.0 %    Eosinophils % 3.0 0.0 - 6.0 %    Basophils % 0.5 0.0 - 2.0 %    Neutrophils Absolute 2.74 1.60 - 5.50 x10*3/uL    Immature Granulocytes Absolute, Automated 0.02 0.00 - 0.50 x10*3/uL    Lymphocytes Absolute 1.08 0.80 - 3.00 x10*3/uL    Monocytes Absolute 0.39 0.05 - 0.80 x10*3/uL    Eosinophils Absolute 0.13 0.00 - 0.40 x10*3/uL    Basophils Absolute 0.02 0.00 - 0.10 x10*3/uL   Magnesium   Result Value Ref Range    Magnesium 1.68 1.60 - 2.40 mg/dL     Assessment & Plan    1. Obstructing left ureteral stone with ALEJANDRO and acute cystitis  -POD #1 cystoscopy with left ureteral stent insertion.  -ALEJANDRO improving.  -Explained that stent is temporary and must be removed at a later date.  -Stent sxs include: flank pain, hematuria, dysuria, bladder spasms.  -Ucx contaminated, recommend finishing 7-day course of PO Cipro 500mg.  -Dr. Wong to schedule patient for outpatient URS with laser litho for definitive stone tx.  -Patient agrees with plan and all questions answered.     Plan of care discussed with Dr. Alli Wong MD.  OK for dc from  standpoint.    Odessa Sung PA-C       [1] atorvastatin, 80 mg,  oral, Nightly  ciprofloxacin, 400 mg, intravenous, q12h  dicyclomine, 20 mg, oral, TID  doxepin, 20 mg, oral, Nightly  levothyroxine, 112 mcg, oral, Daily  liothyronine, 10 mcg, oral, Daily  metroNIDAZOLE, 250 mg, oral, TID  pantoprazole, 40 mg, oral, BID  sertraline, 100 mg, oral, Daily  sucralfate, 1 g, oral, TID  tamsulosin, 0.4 mg, oral, Daily    [2]    [3] PRN medications: acetaminophen **OR** acetaminophen **OR** acetaminophen, HYDROmorphone, hydrOXYzine HCL, melatonin, ondansetron ODT **OR** ondansetron, polyethylene glycol

## 2025-06-26 NOTE — DISCHARGE INSTRUCTIONS
Follow up with Dr. Wong with urology as scheduled.   You have been prescribed a 5 day course of ciprofloxacin for UTI, please take this as directed.

## 2025-06-26 NOTE — DISCHARGE SUMMARY
Discharge Diagnosis  Obstructive uropathy  L ureteral stone  ALEJANDRO  UTI           Issues Requiring Follow-Up  Urology follow up    Discharge Meds     Medication List      START taking these medications     ciprofloxacin 500 mg tablet; Commonly known as: Cipro; Take 1 tablet   (500 mg) by mouth 2 times a day for 5 days.   diclofenac 50 mg EC tablet; Commonly known as: Voltaren; Take 1 tablet   (50 mg) by mouth 2 times a day as needed (for moderate pain).   oxyCODONE 5 mg immediate release tablet; Commonly known as: Roxicodone;   Take 1 tablet (5 mg) by mouth every 6 hours if needed for severe pain (7 -   10).   phenazopyridine 100 mg tablet; Commonly known as: Pyridium; Take 1   tablet (100 mg) by mouth 3 times a day as needed for bladder spasms (or   burning with urination).   tamsulosin 0.4 mg 24 hr capsule; Commonly known as: Flomax; Take 1   capsule (0.4 mg) by mouth once daily at bedtime.     CONTINUE taking these medications     atorvastatin 80 mg tablet; Commonly known as: Lipitor   dicyclomine 20 mg tablet; Commonly known as: Bentyl   doxepin 10 mg capsule; Commonly known as: SINEquan   hydrocortisone 2.5 % ointment   hydrOXYzine HCL 10 mg tablet; Commonly known as: Atarax   hyoscyamine 0.125 mg disintegrating tablet; Commonly known as: Anaspaz   liothyronine 5 mcg tablet; Commonly known as: Cytomel   metroNIDAZOLE 250 mg tablet; Commonly known as: Flagyl   pantoprazole 40 mg EC tablet; Commonly known as: ProtoNix   sertraline 100 mg tablet; Commonly known as: Zoloft   sucralfate 1 gram tablet; Commonly known as: Carafate   Synthroid 112 mcg tablet; Generic drug: levothyroxine   Vitamin D3 25 mcg (1,000 units) tablet; Generic drug: cholecalciferol       Test Results Pending At Discharge  Pending Labs       No current pending labs.            Hospital Course   69-year-old female with past medical history kidney stones, CKD, anxiety, GERD, hyperlipidemia, hypothyroidism, IBS, who presented to the emergency  department sudden onset left lower quadrant abdominal pain.  She was found to have a obstructing left ureteral stone with moderate left hydronephrosis and ALEJANDRO.  She was started on IV fluids and Flomax as well as Cipro for suspected UTI.  Urology was consulted and she underwent cystoscopy with left ureteral stent placement with the stone left in place.  Her pain has subsequently resolved.  She remains afebrile and hemodynamically stable.  She will complete a course of oral Cipro as an outpatient follow-up with urology for definitive stone treatment and stent removal as an outpatient.    Pertinent Physical Exam At Time of Discharge  Physical Exam    GEN: healthy appearing, appears stated age, NAD  HEENT: NCAT, PERRLA, EOMI, moist mucous membranes  NECK: supple, no masses  CV: RRR, no m/r/g, no LE edema  LUNGS: CTAB, no w/r/c  ABD: soft, NT, ND, NBS  SKIN: no rashes  MSK; no gross deformities, normal joints  NEURO: A+Ox3, no FND  PSYCH: appropriate mood, affect       Outpatient Follow-Up  No future appointments.      Andrew You MD

## 2025-06-30 ENCOUNTER — TELEPHONE (OUTPATIENT)
Dept: UROLOGY | Facility: CLINIC | Age: 70
End: 2025-06-30
Payer: MEDICARE

## 2025-06-30 NOTE — TELEPHONE ENCOUNTER
Patient states she is out of pain pills, refuses to take tylenol or motrin wants more pain pills or she wants her surgery moved up asap.  Please advise

## 2025-07-02 ENCOUNTER — TELEPHONE (OUTPATIENT)
Dept: UROLOGY | Facility: CLINIC | Age: 70
End: 2025-07-02
Payer: MEDICARE

## 2025-07-02 NOTE — TELEPHONE ENCOUNTER
Call to pt regarding appointment to discuss earlier surgery date for stone treatment. No answer, message left advising appointment with Dr. Elizabeth scheduled for 7/3 at 930. Requested pt call back to confirm.

## 2025-07-03 ENCOUNTER — OFFICE VISIT (OUTPATIENT)
Age: 70
End: 2025-07-03
Payer: MEDICARE

## 2025-07-03 VITALS
WEIGHT: 125.2 LBS | BODY MASS INDEX: 19.04 KG/M2 | HEART RATE: 76 BPM | DIASTOLIC BLOOD PRESSURE: 64 MMHG | TEMPERATURE: 97.8 F | SYSTOLIC BLOOD PRESSURE: 108 MMHG

## 2025-07-03 DIAGNOSIS — R31.9 HEMATURIA, UNSPECIFIED TYPE: ICD-10-CM

## 2025-07-03 DIAGNOSIS — N20.0 NEPHROLITHIASIS: Primary | ICD-10-CM

## 2025-07-03 DIAGNOSIS — N20.1 URETERAL STONE: ICD-10-CM

## 2025-07-03 LAB
POC APPEARANCE, URINE: CLEAR
POC BILIRUBIN, URINE: NEGATIVE
POC BLOOD, URINE: ABNORMAL
POC COLOR, URINE: YELLOW
POC GLUCOSE, URINE: NEGATIVE MG/DL
POC KETONES, URINE: NEGATIVE MG/DL
POC LEUKOCYTES, URINE: NEGATIVE
POC NITRITE,URINE: POSITIVE
POC PH, URINE: 5 PH
POC PROTEIN, URINE: ABNORMAL MG/DL
POC SPECIFIC GRAVITY, URINE: >=1.03
POC UROBILINOGEN, URINE: 0.2 EU/DL

## 2025-07-03 PROCEDURE — 1111F DSCHRG MED/CURRENT MED MERGE: CPT

## 2025-07-03 PROCEDURE — 1159F MED LIST DOCD IN RCRD: CPT

## 2025-07-03 PROCEDURE — 3078F DIAST BP <80 MM HG: CPT

## 2025-07-03 PROCEDURE — 81003 URINALYSIS AUTO W/O SCOPE: CPT

## 2025-07-03 PROCEDURE — 3074F SYST BP LT 130 MM HG: CPT

## 2025-07-03 PROCEDURE — 99203 OFFICE O/P NEW LOW 30 MIN: CPT

## 2025-07-03 RX ORDER — VENLAFAXINE HYDROCHLORIDE 37.5 MG/1
CAPSULE, EXTENDED RELEASE ORAL
COMMUNITY

## 2025-07-03 RX ORDER — CELECOXIB 200 MG/1
200 CAPSULE ORAL 2 TIMES DAILY
Qty: 28 CAPSULE | Refills: 0 | Status: SHIPPED | OUTPATIENT
Start: 2025-07-03 | End: 2025-07-17

## 2025-07-03 RX ORDER — SOLIFENACIN SUCCINATE 5 MG/1
5 TABLET, FILM COATED ORAL DAILY
Qty: 14 TABLET | Refills: 0 | Status: SHIPPED | OUTPATIENT
Start: 2025-07-03 | End: 2025-07-17

## 2025-07-03 RX ORDER — CIPROFLOXACIN 2 MG/ML
400 INJECTION, SOLUTION INTRAVENOUS ONCE
OUTPATIENT
Start: 2025-07-03 | End: 2025-07-03

## 2025-07-03 RX ORDER — LEVOTHYROXINE SODIUM 88 UG/1
88 TABLET ORAL DAILY
COMMUNITY

## 2025-07-03 NOTE — H&P (VIEW-ONLY)
Chief complaint:  No chief complaint on file.  Referring physician:  No ref. provider found     SUBJECTIVE:  HPI:  Sosa Noland is a 69 y.o. female with a history of kidney stones, CKD, anxiety, GERD, hyperlipidemia, hypothyroidism and IBS who presents for initial evaluation of left ureteral stone. She attended the ED 06/24/25 due to left flank pain and a stent was placed. They started Cipro.     CT Abd Pelv w IV contrast (6/24/25): Moderate left hydroureteronephrosis to the level of the 6 mm calculus  in the proximal left ureter    She does not tolerate the stent well and asks to do the surgery ASAP.    Medical history:   has a past medical history of Other conditions influencing health status (12/01/2015).   Surgical history:   has a past surgical history that includes Appendectomy (03/25/2014); Colonoscopy (03/25/2014); Foot surgery (12/01/2015); and Total hip arthroplasty (12/01/2015).  Family history:  family history is not on file.  Social history:   reports that she has never smoked. She has never used smokeless tobacco.    Medications:    Current Outpatient Medications   Medication Instructions    atorvastatin (LIPITOR) 80 mg, oral, Nightly    cholecalciferol (VITAMIN D3) 50 mcg, oral, Daily    diclofenac (VOLTAREN) 50 mg, oral, 2 times daily PRN    dicyclomine (BENTYL) 20 mg, oral, 3 times daily    doxepin (SINEQUAN) 20 mg, oral, Nightly    hydrocortisone 2.5 % ointment 1 Application, Topical, 2 times daily PRN    hydrOXYzine HCL (ATARAX) 10 mg, oral, 2 times daily PRN    hyoscyamine (ANASPAZ) 0.125 mg, sublingual, Every 6 hours PRN    levothyroxine (SYNTHROID) 112 mcg, oral, Daily, Take on an empty stomach at the same time each day, either 30 to 60 minutes prior to breakfast    liothyronine (CYTOMEL) 10 mcg, oral, Daily    metroNIDAZOLE (FLAGYL) 250 mg, 3 times daily    oxyCODONE (ROXICODONE) 5 mg, oral, Every 6 hours PRN    pantoprazole (PROTONIX) 40 mg, oral, 2 times daily, Do not crush, chew, or split.  "   phenazopyridine (PYRIDIUM) 100 mg, oral, 3 times daily PRN    sertraline (ZOLOFT) 100 mg, oral, Daily    sucralfate (CARAFATE) 1 g, oral, 3 times daily    tamsulosin (FLOMAX) 0.4 mg, oral, Nightly      Allergies:    RX Allergies[1]     ROS:  14-point review of systems negative except as noted above.    OBJECTIVE:  There were no vitals taken for this visit.There is no height or weight on file to calculate BMI.    Physical exam  General:  No acute distress  HEENT:  EOMI  CV:  Regular rate  Pulm:  Nonlabored respirations  Abd:  Soft, non-distended  :  No suprapubic or CVA tenderness  MSK:  No contractures  Neuro:  Motor intact  Psych:  Appropriate affect    Labs:    Lab Results   Component Value Date    WBC 4.4 06/26/2025    HGB 11.0 (L) 06/26/2025    HCT 33.9 (L) 06/26/2025     06/26/2025    ALT 9 06/26/2025    AST 16 06/26/2025     06/26/2025    K 4.1 06/26/2025     (H) 06/26/2025    CREATININE 1.18 (H) 06/26/2025    BUN 16 06/26/2025    CO2 24 06/26/2025    INR 1.1 09/16/2022    HGBA1C 6.4 (H) 06/16/2025     Urine Culture (no units)   Date Value   06/24/2025     Growth indicates contamination with periurethral allyson. Repeat culture if clinically indicated.    No results found for: \"PSA\"    Imaging:  All imaging discussed in HPI was independently reviewed.    ASSESSMENT:  Discussed about her stone characteristics and alternatives. Benefits and rsisk discussed. She decides for URS.    PLAN:  URS    Follow-up after surgery    Adolph Lee MD    Problem List Items Addressed This Visit          Genitourinary and Reproductive    Nephrolithiasis    Relevant Orders    POCT UA Automated manually resulted             [1]   Allergies  Allergen Reactions    Ultracef Other     "

## 2025-07-07 DIAGNOSIS — N20.0 NEPHROLITHIASIS: ICD-10-CM

## 2025-07-09 LAB — BACTERIA UR CULT: NORMAL

## 2025-07-10 ENCOUNTER — TELEPHONE (OUTPATIENT)
Age: 70
End: 2025-07-10
Payer: MEDICARE

## 2025-07-10 DIAGNOSIS — N20.1 URETERAL STONE: ICD-10-CM

## 2025-07-10 RX ORDER — CIPROFLOXACIN 500 MG/1
500 TABLET, FILM COATED ORAL 2 TIMES DAILY
Qty: 14 TABLET | Refills: 0 | Status: SHIPPED | OUTPATIENT
Start: 2025-07-15 | End: 2025-07-22

## 2025-07-10 NOTE — TELEPHONE ENCOUNTER
Pt called regarding culture results, advised results show mixed allyson that are not indicative of UTI. Voiced understanding of Cipro order. Requested earlier surgery date, made aware she has the earliest available surgery but will be notified if an earlier date opens up. Stated she continues with urinary frequency, dysuria, and hematuria. Discussed OTC Azo to minimize symptoms and made aware of orange discoloration to urine with use. Advised ED for intolerable pain, fever/chills, etc. Pt concerns relayed to Dr. Elizabeth with no new orders.

## 2025-07-10 NOTE — TELEPHONE ENCOUNTER
Final culture results showed mixed allyson. Per Dr. Elizabeth, order placed for cipro 500 mg by mouth twice daily x 7 days, beginning on 7/15. Call to pt with no answer, message left.

## 2025-07-16 RX ORDER — FERROUS SULFATE 325(65) MG
325 TABLET ORAL
COMMUNITY

## 2025-07-17 NOTE — PREPROCEDURE INSTRUCTIONS
Current Medications    Medication Instructions    atorvastatin (Lipitor) 80 mg tablet Continue until night before surgery    celecoxib (CeleBREX) 200 mg capsule Hold starting 7/17    cholecalciferol (Vitamin D3) 25 mcg (1,000 units) tablet Stop 7 days before surgery     ciprofloxacin (Cipro) 500 mg tablet Take morning of surgery with sip of water    dicyclomine (Bentyl) 20 mg tablet Continue until night before surgery     doxepin (SINEquan) 10 mg capsule Continue until night before surgery     ferrous sulfate 325 mg (65 mg elemental) tablet Stop 7 days before surgery     hyoscyamine (Anaspaz) 0.125 mg disintegrating tablet Continue as prescribed     levothyroxine (Synthroid) 112 mcg tablet Take morning of surgery with sip of water, no other fluids     levothyroxine (Synthroid, Levoxyl) 88 mcg tablet Take morning of surgery with sip of water, no other fluids     liothyronine (Cytomel) 5 mcg tablet Take morning of surgery with sip of water, no other fluids     pantoprazole (ProtoNix) 40 mg EC tablet Take morning of surgery with sip of water, no other fluids     sertraline (Zoloft) 100 mg tablet Take morning of surgery with sip of water, no other fluids     solifenacin (VESIcare) 5 mg tablet Hold dose the day of surgery     tamsulosin (Flomax) 0.4 mg 24 hr capsule Continue until night before surgery     UNABLE TO FIND Biotin-Carotene supplement Stop 7 days before surgery           NPO Instructions:    Do not eat any food after midnight the night before your surgery/procedure.  You may have 13 ounces of clear liquids until TWO hours before your ARRIVAL time to the hospital the day of your surgery/procedure (completed by 0745). This includes water, black tea/coffee, (no milk or cream) apple juice and electrolyte drinks (Gatorade-no red colors).  You may chew gum up to TWO hours before your surgery/procedure.    Additional Instructions:     Day of Surgery: Arrival 0945, surgery 1110.    Enter through the main entrance of  [facility], located at [address]. Proceed to registration, located on the right hand side of the staircase. You will need your ID and insurance card for registration. Please ensure you have a responsible adult to drive you home. A responsible adult DOES NOT include rideshare service drivers ([employer], Lyft, etc), cab drivers, or public transportation drivers, but “provide a ride” is acceptable.    Take a shower before your procedure. After your shower avoid lotions, powders, deodorants or anything applied to the skin. If you wear contacts or glasses, wear the glasses. If you do not have glasses, please bring a case for your contacts. You may wear hearing aids and dentures, bring a case for them or we will provide one. Make sure you wear something loose and comfortable. Keep in mind your surgical procedure and wear something that will accommodate incisions or bandages. Please remove all jewelry and piercing's.     For further questions Mauri JANE can be contacted at [phone] between 7AM-3PM.

## 2025-07-18 ENCOUNTER — HOSPITAL ENCOUNTER (OUTPATIENT)
Facility: HOSPITAL | Age: 70
Setting detail: OUTPATIENT SURGERY
Discharge: HOME | End: 2025-07-18
Payer: MEDICARE

## 2025-07-18 ENCOUNTER — APPOINTMENT (OUTPATIENT)
Dept: RADIOLOGY | Facility: HOSPITAL | Age: 70
End: 2025-07-18
Payer: MEDICARE

## 2025-07-18 ENCOUNTER — ANESTHESIA (OUTPATIENT)
Dept: OPERATING ROOM | Facility: HOSPITAL | Age: 70
End: 2025-07-18
Payer: MEDICARE

## 2025-07-18 ENCOUNTER — ANESTHESIA EVENT (OUTPATIENT)
Dept: OPERATING ROOM | Facility: HOSPITAL | Age: 70
End: 2025-07-18
Payer: MEDICARE

## 2025-07-18 VITALS
HEART RATE: 63 BPM | RESPIRATION RATE: 16 BRPM | HEIGHT: 69 IN | DIASTOLIC BLOOD PRESSURE: 63 MMHG | TEMPERATURE: 97.2 F | OXYGEN SATURATION: 100 % | WEIGHT: 125 LBS | BODY MASS INDEX: 18.51 KG/M2 | SYSTOLIC BLOOD PRESSURE: 119 MMHG

## 2025-07-18 DIAGNOSIS — N20.0 NEPHROLITHIASIS: Primary | ICD-10-CM

## 2025-07-18 PROBLEM — Z98.84 S/P BARIATRIC SURGERY: Status: ACTIVE | Noted: 2019-09-06

## 2025-07-18 PROBLEM — F33.0 MDD (MAJOR DEPRESSIVE DISORDER), RECURRENT EPISODE, MILD: Status: ACTIVE | Noted: 2023-04-20

## 2025-07-18 PROBLEM — F41.1 GAD (GENERALIZED ANXIETY DISORDER): Status: ACTIVE | Noted: 2022-01-04

## 2025-07-18 LAB
ATRIAL RATE: 76 BPM
GLUCOSE BLD MANUAL STRIP-MCNC: 113 MG/DL (ref 74–99)
P AXIS: -21 DEGREES
P OFFSET: 177 MS
P ONSET: 149 MS
POCT INTERNATIONAL NORMALIZATION RATIO: 1.1
POCT PROTHROMBIN TIME: 13.1 SECONDS
PR INTERVAL: 144 MS
Q ONSET: 221 MS
QRS COUNT: 12 BEATS
QRS DURATION: 78 MS
QT INTERVAL: 412 MS
QTC CALCULATION(BAZETT): 463 MS
QTC FREDERICIA: 445 MS
R AXIS: 74 DEGREES
T AXIS: 70 DEGREES
T OFFSET: 427 MS
VENTRICULAR RATE: 76 BPM

## 2025-07-18 PROCEDURE — 3600000004 HC OR TIME - INITIAL BASE CHARGE - PROCEDURE LEVEL FOUR

## 2025-07-18 PROCEDURE — C1747 HC OR 272 NO HCPCS: HCPCS

## 2025-07-18 PROCEDURE — C1769 GUIDE WIRE: HCPCS

## 2025-07-18 PROCEDURE — 3700000001 HC GENERAL ANESTHESIA TIME - INITIAL BASE CHARGE

## 2025-07-18 PROCEDURE — 3600000009 HC OR TIME - EACH INCREMENTAL 1 MINUTE - PROCEDURE LEVEL FOUR

## 2025-07-18 PROCEDURE — 2500000004 HC RX 250 GENERAL PHARMACY W/ HCPCS (ALT 636 FOR OP/ED): Performed by: NURSE ANESTHETIST, CERTIFIED REGISTERED

## 2025-07-18 PROCEDURE — 82947 ASSAY GLUCOSE BLOOD QUANT: CPT

## 2025-07-18 PROCEDURE — 7100000009 HC PHASE TWO TIME - INITIAL BASE CHARGE

## 2025-07-18 PROCEDURE — 3700000002 HC GENERAL ANESTHESIA TIME - EACH INCREMENTAL 1 MINUTE

## 2025-07-18 PROCEDURE — 2720000007 HC OR 272 NO HCPCS

## 2025-07-18 PROCEDURE — 7100000001 HC RECOVERY ROOM TIME - INITIAL BASE CHARGE

## 2025-07-18 PROCEDURE — 2500000004 HC RX 250 GENERAL PHARMACY W/ HCPCS (ALT 636 FOR OP/ED): Mod: JW

## 2025-07-18 PROCEDURE — 7100000002 HC RECOVERY ROOM TIME - EACH INCREMENTAL 1 MINUTE

## 2025-07-18 PROCEDURE — 2500000004 HC RX 250 GENERAL PHARMACY W/ HCPCS (ALT 636 FOR OP/ED): Performed by: ANESTHESIOLOGY

## 2025-07-18 PROCEDURE — 7100000010 HC PHASE TWO TIME - EACH INCREMENTAL 1 MINUTE

## 2025-07-18 RX ORDER — ACETAMINOPHEN 325 MG/1
650 TABLET ORAL EVERY 4 HOURS PRN
Status: DISCONTINUED | OUTPATIENT
Start: 2025-07-18 | End: 2025-07-18 | Stop reason: HOSPADM

## 2025-07-18 RX ORDER — CIPROFLOXACIN 2 MG/ML
400 INJECTION, SOLUTION INTRAVENOUS ONCE
Status: DISCONTINUED | OUTPATIENT
Start: 2025-07-18 | End: 2025-07-18 | Stop reason: HOSPADM

## 2025-07-18 RX ORDER — ONDANSETRON HYDROCHLORIDE 2 MG/ML
INJECTION, SOLUTION INTRAVENOUS AS NEEDED
Status: DISCONTINUED | OUTPATIENT
Start: 2025-07-18 | End: 2025-07-18

## 2025-07-18 RX ORDER — PROCHLORPERAZINE EDISYLATE 5 MG/ML
5 INJECTION INTRAMUSCULAR; INTRAVENOUS ONCE AS NEEDED
Status: DISCONTINUED | OUTPATIENT
Start: 2025-07-18 | End: 2025-07-18 | Stop reason: HOSPADM

## 2025-07-18 RX ORDER — PROPOFOL 10 MG/ML
INJECTION, EMULSION INTRAVENOUS AS NEEDED
Status: DISCONTINUED | OUTPATIENT
Start: 2025-07-18 | End: 2025-07-18

## 2025-07-18 RX ORDER — KETOROLAC TROMETHAMINE 30 MG/ML
15 INJECTION, SOLUTION INTRAMUSCULAR; INTRAVENOUS ONCE AS NEEDED
Status: DISCONTINUED | OUTPATIENT
Start: 2025-07-18 | End: 2025-07-18 | Stop reason: HOSPADM

## 2025-07-18 RX ORDER — ALBUTEROL SULFATE 0.83 MG/ML
2.5 SOLUTION RESPIRATORY (INHALATION) ONCE AS NEEDED
Status: DISCONTINUED | OUTPATIENT
Start: 2025-07-18 | End: 2025-07-18 | Stop reason: HOSPADM

## 2025-07-18 RX ORDER — DEXMEDETOMIDINE HYDROCHLORIDE 100 UG/ML
INJECTION, SOLUTION INTRAVENOUS AS NEEDED
Status: DISCONTINUED | OUTPATIENT
Start: 2025-07-18 | End: 2025-07-18

## 2025-07-18 RX ORDER — KETOROLAC TROMETHAMINE 10 MG/1
10 TABLET, FILM COATED ORAL EVERY 8 HOURS PRN
Status: CANCELLED | OUTPATIENT
Start: 2025-07-18 | End: 2025-07-22

## 2025-07-18 RX ORDER — FENTANYL CITRATE 50 UG/ML
25 INJECTION, SOLUTION INTRAMUSCULAR; INTRAVENOUS EVERY 5 MIN PRN
Status: DISCONTINUED | OUTPATIENT
Start: 2025-07-18 | End: 2025-07-18 | Stop reason: HOSPADM

## 2025-07-18 RX ORDER — FENTANYL CITRATE 50 UG/ML
50 INJECTION, SOLUTION INTRAMUSCULAR; INTRAVENOUS EVERY 5 MIN PRN
Status: DISCONTINUED | OUTPATIENT
Start: 2025-07-18 | End: 2025-07-18 | Stop reason: HOSPADM

## 2025-07-18 RX ORDER — IPRATROPIUM BROMIDE 0.5 MG/2.5ML
500 SOLUTION RESPIRATORY (INHALATION) ONCE
Status: DISCONTINUED | OUTPATIENT
Start: 2025-07-18 | End: 2025-07-18 | Stop reason: HOSPADM

## 2025-07-18 RX ORDER — LIDOCAINE HYDROCHLORIDE 10 MG/ML
0.1 INJECTION, SOLUTION INFILTRATION; PERINEURAL ONCE
Status: DISCONTINUED | OUTPATIENT
Start: 2025-07-18 | End: 2025-07-18 | Stop reason: HOSPADM

## 2025-07-18 RX ORDER — ONDANSETRON HYDROCHLORIDE 2 MG/ML
4 INJECTION, SOLUTION INTRAVENOUS ONCE AS NEEDED
Status: DISCONTINUED | OUTPATIENT
Start: 2025-07-18 | End: 2025-07-18 | Stop reason: HOSPADM

## 2025-07-18 RX ORDER — SODIUM CHLORIDE, SODIUM LACTATE, POTASSIUM CHLORIDE, CALCIUM CHLORIDE 600; 310; 30; 20 MG/100ML; MG/100ML; MG/100ML; MG/100ML
100 INJECTION, SOLUTION INTRAVENOUS CONTINUOUS
Status: DISCONTINUED | OUTPATIENT
Start: 2025-07-18 | End: 2025-07-18 | Stop reason: HOSPADM

## 2025-07-18 RX ORDER — LIDOCAINE HCL/PF 100 MG/5ML
SYRINGE (ML) INTRAVENOUS AS NEEDED
Status: DISCONTINUED | OUTPATIENT
Start: 2025-07-18 | End: 2025-07-18

## 2025-07-18 RX ADMIN — FENTANYL CITRATE 25 MCG: 50 INJECTION INTRAMUSCULAR; INTRAVENOUS at 12:45

## 2025-07-18 RX ADMIN — DEXMEDETOMIDINE 20 MCG: 200 INJECTION, SOLUTION INTRAVENOUS at 11:14

## 2025-07-18 RX ADMIN — LIDOCAINE HYDROCHLORIDE 50 MG: 20 INJECTION INTRAVENOUS at 11:14

## 2025-07-18 RX ADMIN — FENTANYL CITRATE 25 MCG: 50 INJECTION INTRAMUSCULAR; INTRAVENOUS at 12:39

## 2025-07-18 RX ADMIN — PROPOFOL 30 MG: 10 INJECTION, EMULSION INTRAVENOUS at 11:14

## 2025-07-18 RX ADMIN — SODIUM CHLORIDE, POTASSIUM CHLORIDE, SODIUM LACTATE AND CALCIUM CHLORIDE: 600; 310; 30; 20 INJECTION, SOLUTION INTRAVENOUS at 11:09

## 2025-07-18 RX ADMIN — DEXMEDETOMIDINE 8 MCG: 200 INJECTION, SOLUTION INTRAVENOUS at 11:44

## 2025-07-18 RX ADMIN — KETOROLAC TROMETHAMINE 15 MG: 30 INJECTION, SOLUTION INTRAMUSCULAR at 12:57

## 2025-07-18 RX ADMIN — ONDANSETRON 4 MG: 2 INJECTION INTRAMUSCULAR; INTRAVENOUS at 11:59

## 2025-07-18 RX ADMIN — PROPOFOL 30 MG: 10 INJECTION, EMULSION INTRAVENOUS at 11:43

## 2025-07-18 RX ADMIN — DEXAMETHASONE SODIUM PHOSPHATE 4 MG: 4 INJECTION, SOLUTION INTRAMUSCULAR; INTRAVENOUS at 11:14

## 2025-07-18 SDOH — HEALTH STABILITY: MENTAL HEALTH: CURRENT SMOKER: 0

## 2025-07-18 ASSESSMENT — PAIN DESCRIPTION - ORIENTATION
ORIENTATION: LOWER
ORIENTATION: LOWER

## 2025-07-18 ASSESSMENT — PAIN SCALES - GENERAL
PAINLEVEL_OUTOF10: 5 - MODERATE PAIN
PAINLEVEL_OUTOF10: 5 - MODERATE PAIN
PAINLEVEL_OUTOF10: 3
PAINLEVEL_OUTOF10: 4
PAINLEVEL_OUTOF10: 3
PAIN_LEVEL: 0
PAINLEVEL_OUTOF10: 5 - MODERATE PAIN
PAINLEVEL_OUTOF10: 0 - NO PAIN
PAINLEVEL_OUTOF10: 0 - NO PAIN

## 2025-07-18 ASSESSMENT — PAIN - FUNCTIONAL ASSESSMENT
PAIN_FUNCTIONAL_ASSESSMENT: 0-10

## 2025-07-18 ASSESSMENT — PAIN DESCRIPTION - DESCRIPTORS
DESCRIPTORS: BURNING

## 2025-07-18 ASSESSMENT — COLUMBIA-SUICIDE SEVERITY RATING SCALE - C-SSRS
2. HAVE YOU ACTUALLY HAD ANY THOUGHTS OF KILLING YOURSELF?: NO
6. HAVE YOU EVER DONE ANYTHING, STARTED TO DO ANYTHING, OR PREPARED TO DO ANYTHING TO END YOUR LIFE?: NO
1. IN THE PAST MONTH, HAVE YOU WISHED YOU WERE DEAD OR WISHED YOU COULD GO TO SLEEP AND NOT WAKE UP?: NO

## 2025-07-18 ASSESSMENT — PAIN DESCRIPTION - LOCATION
LOCATION: ABDOMEN

## 2025-07-18 NOTE — ANESTHESIA PROCEDURE NOTES
Airway  Date/Time: 7/18/2025 11:20 AM  Reason: elective    Airway not difficult    Staffing  Performed: RAHAT   Authorized by: Jese Oliveira MD    Performed by: Kade Adair    Patient Condition  Indications for airway management: anesthesia  MILS not maintained throughout  Sedation level: deep     Final Airway Details   Preoxygenated: yes  Final airway type: supraglottic airway  Successful airway:   Size: 3  Number of attempts at approach: 1  Number of other approaches attempted: 0    Additional Comments  3.0 iGel

## 2025-07-18 NOTE — ANESTHESIA PREPROCEDURE EVALUATION
Patient: Sosa Noland    Procedure Information       Date/Time: 07/18/25 1110    Procedure: LEFT URETERAL LASER LITHOTRIPSY WITH C-ARM (Left) - Ureteral Lithotripsy Laser    Location: [facility] 02 / [facility]    Surgeons: Adolph Lee MD            Relevant Problems   Anesthesia (within normal limits)      Cardiac   (+) Hyperlipidemia   (+) Primary hypertension      Neuro   (+) REINIER (generalized anxiety disorder)   (+) MDD (major depressive disorder), recurrent episode, mild      GI   (+) GERD (gastroesophageal reflux disease)      /Renal   (+) Hydronephrosis of left kidney   (+) Nephrolithiasis      Endocrine   (+) Hypothyroidism       Clinical information reviewed:    Allergies  Meds  Problems              NPO Detail:  No data recorded     Physical Exam    Airway  Mallampati: I  TM distance: >3 FB  Neck ROM: full  Mouth opening: 3 or more finger widths     Cardiovascular - normal exam   Dental        Pulmonary - normal exam   Abdominal - normal exam           Anesthesia Plan    History of general anesthesia?: yes  History of complications of general anesthesia?: no    ASA 3     general     The patient is not a current smoker.    intravenous induction   Postoperative pain plan includes opioids.  Trial extubation is planned.  Anesthetic plan and risks discussed with patient.  Use of blood products discussed with patient who consented to blood products.    Plan discussed with CRNA.

## 2025-07-18 NOTE — SIGNIFICANT EVENT
Discharge instructions reviewed with patient and patients . Patient denies questions related to material reviewed. Patients VSS. Pain controlled per patient. Patient denies nausea.

## 2025-07-18 NOTE — DISCHARGE INSTRUCTIONS
[facility]    DISCHARGE INSTRUCTIONS     Cystoscopy  Stent removal  Ureteroscopy   Laser Lithotripsy    Sosa Noland    Call [phone] during regular daytime business hours (8:00 am - 5:00 pm) and after 5:00 pm ask for the Urology resident with any questions or concerns.    If it is a life-threatening situation, proceed to the nearest emergency department.        Follow-up appointment:  07/31/25    Thank you for the opportunity to care for you today.  Your health and healing are very important to us.  We hope we made you feel as comfortable as possible and are committed to your recovery and continued well-being.      The following is a brief overview of your Ureteroscopy Laser Lithotripsy procedure today. Some of the information contained on this summary may be confidential.  This information should be kept in your records and should be shared with your regular doctor.    Physicians:   Dr. Elizabeth    Procedure performed: Lasering of your kidney stone      What to Expect During your Recovery and Home Care  Anesthesia Side Effects   You received anesthesia today.  You may feel sleepy, tired, or have a sore throat.   You may also feel drowsiness, dizziness, or inability to think clearly.  For your safety, do not drive, drink alcoholic beverages, take any unprescribed medication or make any important decisions for 24 hours.  A responsible adult should be with you for 24 hours.        Activity and Recovery    No heavy lifting today. Rest for the next 24 hours.    Pain Control  Unfortunately, you may experience pain after your procedure.  Frequency and urgency to urinate and mild discomfort are expected. Adequate pain management can include alternative measures to help ease your pain and that can include over the counter Tylenol/ibuprofen and a heating pad or Toradol which can be taken as prescribed as needed for breakthrough pain. Do not take more than 4,000mg of Tylenol in a 24-hour period.       Nausea/Vomiting   Clear liquids are best tolerated at first. Start slow, advance your diet as tolerated to normal foods. Avoid spicy, greasy, heavy foods at first. Also, you may feel nauseous or like you need to vomit if you take any type of medication on an empty stomach.  Call your physician if you are unable to eat or drink and have persistent vomiting.    Signs of Bleeding   You could have some blood in your urine off and on over the next several weeks. Your urine will be light pink to yellow.    Treatment/wound care:   It is okay to shower 24 hours after time of surgery.    Signs of Infection  Signs of infection can include fever, chills, burning sensation with passing of urine, or severe abdominal pain.  If you see any of these occur, please contact your doctor's office at [phone].  Any fever higher than 100.4, especially if associated with an ill feeling, abdominal pain, chills, or nausea should be reported to your surgeon.      Assist in bowel movements/urination  Increase fiber in diet  Increase water (6 to 8 glasses)  Increase walking   Urination should occur within 6 hours of anesthesia  If you have tried these methods and your bladder still feels full and you cannot use the bathroom, please go to your nearest Emergency room/contact your physician.    Additional Instructions:   Pieces of your kidney stone may pass in the urine for a few days and may cause some mild pain.

## 2025-07-18 NOTE — OP NOTE
LEFT URETERAL LASER LITHOTRIPSY WITH C-ARM (L) Operative Note     Date: 2025  OR Location: [facility]    Name: Sosa Noland, : 1955, Age: 69 y.o., MRN: [id number], Sex: female    Diagnosis  Pre-op Diagnosis      * Ureteral stone [N20.1] Post-op Diagnosis     * Ureteral stone [N20.1]     Procedures  LEFT URETERAL LASER LITHOTRIPSY WITH C-ARM  47133 - VT CYSTO FRAGMENTATION URETERAL STONE      Surgeons      * dAolph Lee - Primary    Resident/Fellow/Other Assistant:  Surgeons and Role:  * No surgeons found with a matching role *    Staff:   Circulator: Yadira  Scrub Person: Shabana Alexander Scrub: Katey    Anesthesia Staff: Anesthesiologist: Jese Oliveira MD  CRNA: KP Marx-REJI, LUIS ANTONIO  SRNA: Kade Adair    Procedure Summary  Anesthesia: General  ASA: III  Estimated Blood Loss: 1 mL  Intra-op Medications:   Administrations occurring from 1110 to 1300 on 25:   Medication Name Total Dose   dexmedeTOMIDine (Precedex) 100 mcg/mL 2 mL single dose vial 28 mcg   lidocaine (cardiac) injection 2% prefilled syringe 50 mg   propofol (Diprivan) injection 10 mg/mL 60 mg              Anesthesia Record               Intraprocedure I/O Totals       None           Specimen: No specimens collected              Drains and/or Catheters: * None in log *    Tourniquet Times:         Implants:     Findings: Stent in place , distal coil mildly encrusted. Proximal ureteral stone, impressed to be CaOxMonohyd.    Indications: Sosa Noland is an 69 y.o. female who is having surgery for Ureteral stone [N20.1]. History of kidney stones, CKD, anxiety, GERD, hyperlipidemia, hypothyroidism and IBS who presents for initial evaluation of left ureteral stone. She attended the ED 25 due to left flank pain and a stent was placed. They started Cipro.      CT Abd Pelv w IV contrast (25): Moderate left hydroureteronephrosis to the level of the 6 mm calculus in the proximal left ureter    The patient  was seen in the preoperative area. The risks, benefits, complications, treatment options, non-operative alternatives, expected recovery and outcomes were discussed with the patient. The possibilities of reaction to medication, pulmonary aspiration, injury to surrounding structures, bleeding, recurrent infection, the need for additional procedures, failure to diagnose a condition, and creating a complication requiring transfusion or operation were discussed with the patient. The patient concurred with the proposed plan, giving informed consent.  The site of surgery was properly noted/marked if necessary per policy. The patient has been actively warmed in preoperative area. Preoperative antibiotics have been ordered and given within 3 hours of incision. She was on Cipro and took it today in the morning. Venous thrombosis prophylaxis have been ordered including bilateral sequential compression devices    Procedure Details: After informed consent was obtained, the patient was taken to the operating room. After adequate anesthesia administered, she was then placed into dorsal lithotomy position. Patient was prepped and draped in sterile fashion. A 22 Fr  rigid cystoscope was passed trough the urethra. Once inside the bladder the stent was removed using a grasper. A guidewire was then passed up into the left ureteral orifice and into the renal pelvis. A second guidewire was introduced using the semirigid ureteroscope and UO engaged and semirigid ureteroscopy performed gently. No stones were identified in the ureter and semirigid ureteroscope was retrieved. A single use disposable 9.5 Fr ureteroscope was used to inspect the full ureter until the stone was identified. Stone identified was fragmented using 0.5-1 J and 5 Hz and a 200 microns fiber. A small fragment was displaced to the renal cavity and treated in the upper pole using 0.5-1 J and 12 Hz. Lithotripsy continued until only dust or very small fragments were seen.  No fragments were removed and sent for analysis.   The full collecting system was surveyed with the single use ureteroscope and there were no clinically significant residual fragments seen. There was no injury or significant bleeding. Scope was removed under direct vision.  No stent was placed. Bladder was drained using the same cystoscope.     Evidence of Infection: No   Complications:  None; patient tolerated the procedure well.    Disposition: PACU - hemodynamically stable.  Condition: stable                 Additional Details: No    Attending Attestation: I was present and scrubbed for the entire procedure.    Adolph Lee  Phone Number: [phone]

## 2025-07-18 NOTE — ANESTHESIA POSTPROCEDURE EVALUATION
Patient: Sosa Noland    Procedure Summary       Date: 07/18/25 Room / Location: [facility] / [facility]    Anesthesia Start: 1109 Anesthesia Stop: 1204    Procedure: LEFT URETERAL LASER LITHOTRIPSY WITH C-ARM (Left) Diagnosis:       Ureteral stone      (Ureteral stone [N20.1])    Surgeons: Adolph Lee MD Responsible Provider: Jese Oliveira MD    Anesthesia Type: general ASA Status: 3            Anesthesia Type: general    Vitals Value Taken Time   /55 07/18/25 12:05   Temp 36.3 07/18/25 12:05   Pulse 61 07/18/25 12:05   Resp 18 07/18/25 12:05   SpO2 100 07/18/25 12:05       Anesthesia Post Evaluation    Patient location during evaluation: PACU  Patient participation: complete - patient participated  Level of consciousness: awake and alert  Pain score: 0  Pain management: adequate  Multimodal analgesia pain management approach  Airway patency: patent  Two or more strategies used to mitigate risk of obstructive sleep apnea  Cardiovascular status: acceptable and hemodynamically stable  Respiratory status: acceptable and nasal cannula  Hydration status: acceptable  Postoperative Nausea and Vomiting: none        There were no known notable events for this encounter.

## 2025-07-24 ENCOUNTER — APPOINTMENT (OUTPATIENT)
Dept: UROLOGY | Facility: CLINIC | Age: 70
End: 2025-07-24
Payer: MEDICARE

## 2025-07-31 ENCOUNTER — APPOINTMENT (OUTPATIENT)
Age: 70
End: 2025-07-31
Payer: MEDICARE

## 2025-08-05 ENCOUNTER — APPOINTMENT (OUTPATIENT)
Age: 70
End: 2025-08-05
Payer: MEDICARE

## 2025-08-07 ENCOUNTER — APPOINTMENT (OUTPATIENT)
Age: 70
End: 2025-08-07
Payer: MEDICARE

## 2025-08-14 ENCOUNTER — APPOINTMENT (OUTPATIENT)
Age: 70
End: 2025-08-14
Payer: MEDICARE

## 2025-08-14 VITALS
DIASTOLIC BLOOD PRESSURE: 82 MMHG | BODY MASS INDEX: 18.19 KG/M2 | SYSTOLIC BLOOD PRESSURE: 130 MMHG | WEIGHT: 120 LBS | HEART RATE: 56 BPM | TEMPERATURE: 97.4 F | HEIGHT: 68 IN

## 2025-08-14 DIAGNOSIS — N20.0 KIDNEY STONE: Primary | ICD-10-CM

## 2025-08-14 PROCEDURE — 3008F BODY MASS INDEX DOCD: CPT

## 2025-08-14 PROCEDURE — 3079F DIAST BP 80-89 MM HG: CPT

## 2025-08-14 PROCEDURE — 99024 POSTOP FOLLOW-UP VISIT: CPT

## 2025-08-14 PROCEDURE — 3075F SYST BP GE 130 - 139MM HG: CPT

## 2025-08-14 PROCEDURE — 1159F MED LIST DOCD IN RCRD: CPT

## 2025-08-14 RX ORDER — ERGOCALCIFEROL 1.25 MG/1
CAPSULE ORAL
COMMUNITY

## 2025-08-14 ASSESSMENT — PATIENT HEALTH QUESTIONNAIRE - PHQ9
1. LITTLE INTEREST OR PLEASURE IN DOING THINGS: NOT AT ALL
SUM OF ALL RESPONSES TO PHQ9 QUESTIONS 1 AND 2: 0
2. FEELING DOWN, DEPRESSED OR HOPELESS: NOT AT ALL

## 2025-08-14 ASSESSMENT — ENCOUNTER SYMPTOMS: DEPRESSION: 0

## 2026-02-24 ENCOUNTER — APPOINTMENT (OUTPATIENT)
Age: 71
End: 2026-02-24
Payer: MEDICARE

## (undated) PROCEDURE — 0T778DZ DILATION OF LEFT URETER WITH INTRALUMINAL DEVICE, VIA NATURAL OR ARTIFICIAL OPENING ENDOSCOPIC: ICD-10-PCS

## (undated) DEVICE — IRRIGATION SET, CYSTOSCOPY, REGULATING CLAMP, STRAIGHT, 81 IN

## (undated) DEVICE — Device

## (undated) DEVICE — LASER FIBER, HOLMIUM 200 (5/BOX)

## (undated) DEVICE — GUIDEWIRE, STRAIGHT, ZIPWIRE, .035 X 150CM, STANDARD

## (undated) DEVICE — GLOVE, SURGICAL, PROTEXIS PI BLUE W/NEUTHERA, 8.0, PF, LF

## (undated) DEVICE — VALVE, ENDOSCOPIC, SURESEAL II, SIZE 0-5FR

## (undated) DEVICE — GLOVE, SURGICAL, PROTEXIS PI , 8.0, PF, LF

## (undated) DEVICE — GOWN, SURGICAL, ROYAL SILK, XXL, STERILE

## (undated) DEVICE — SLEEVE, VASO PRESS, CALF GARMENT, MEDIUM, GREEN

## (undated) DEVICE — TRAY, MINOR, SINGLE BASIN, STERILE

## (undated) DEVICE — TRAY, SKIN SCRUB, WET PREP, WITH 4 COMPARMENT

## (undated) DEVICE — GUIDEWIRE, DUAL SENSOR, .035 X 150 STRAIGHT,  3CM

## (undated) DEVICE — STRAP, ARM BOARD, 32 X 1.5

## (undated) DEVICE — SOLUTION, SODIUM CHLORIDE 0.9%, 3000ML, BAG

## (undated) DEVICE — SCOPE, LITHOVUE SUD ONLY, GLOBAL STANDARD

## (undated) DEVICE — TUBING, SUCTION, 6MM X 10, CLEAN N-COND

## (undated) DEVICE — HOLSTER, ELECTROSURGERY ACCESSORY, STERILE

## (undated) DEVICE — CATHETER, URETERAL, POLLACK, OPEN END, 5.5 FR, 70 CM

## (undated) DEVICE — DRAINBAG, 15.5 X 31, 2600/2800 UROVIEW, STERILE

## (undated) DEVICE — SYRINGE, 10 CC, LUER LOCK